# Patient Record
Sex: FEMALE | Race: WHITE | Employment: PART TIME | URBAN - METROPOLITAN AREA
[De-identification: names, ages, dates, MRNs, and addresses within clinical notes are randomized per-mention and may not be internally consistent; named-entity substitution may affect disease eponyms.]

---

## 2017-02-02 DIAGNOSIS — M54.10 BACK PAIN WITH RIGHT-SIDED RADICULOPATHY: ICD-10-CM

## 2017-02-03 RX ORDER — HYDROCODONE BITARTRATE AND ACETAMINOPHEN 5; 325 MG/1; MG/1
1 TABLET ORAL EVERY 4 HOURS PRN
Qty: 60 TABLET | Refills: 0 | Status: SHIPPED | OUTPATIENT
Start: 2017-02-03 | End: 2017-03-29 | Stop reason: SDUPTHER

## 2017-03-29 ENCOUNTER — OFFICE VISIT (OUTPATIENT)
Dept: FAMILY MEDICINE CLINIC | Age: 57
End: 2017-03-29

## 2017-03-29 VITALS
HEART RATE: 76 BPM | DIASTOLIC BLOOD PRESSURE: 72 MMHG | HEIGHT: 64 IN | BODY MASS INDEX: 28.19 KG/M2 | SYSTOLIC BLOOD PRESSURE: 122 MMHG | WEIGHT: 165.13 LBS | RESPIRATION RATE: 12 BRPM

## 2017-03-29 DIAGNOSIS — M54.10 BACK PAIN WITH RIGHT-SIDED RADICULOPATHY: ICD-10-CM

## 2017-03-29 DIAGNOSIS — R11.0 NAUSEA: ICD-10-CM

## 2017-03-29 DIAGNOSIS — I10 ESSENTIAL HYPERTENSION: Primary | ICD-10-CM

## 2017-03-29 DIAGNOSIS — F34.1 DYSTHYMIA: ICD-10-CM

## 2017-03-29 DIAGNOSIS — E78.00 PURE HYPERCHOLESTEROLEMIA: ICD-10-CM

## 2017-03-29 DIAGNOSIS — Z12.11 COLON CANCER SCREENING: ICD-10-CM

## 2017-03-29 DIAGNOSIS — M79.7 FIBROMYALGIA: ICD-10-CM

## 2017-03-29 PROCEDURE — 99213 OFFICE O/P EST LOW 20 MIN: CPT | Performed by: FAMILY MEDICINE

## 2017-03-29 RX ORDER — CITALOPRAM 40 MG/1
TABLET ORAL
Qty: 90 TABLET | Refills: 1 | Status: SHIPPED | OUTPATIENT
Start: 2017-03-29 | End: 2017-06-15 | Stop reason: SDUPTHER

## 2017-03-29 RX ORDER — LISINOPRIL 20 MG/1
TABLET ORAL
Qty: 90 TABLET | Refills: 1 | Status: SHIPPED | OUTPATIENT
Start: 2017-03-29 | End: 2017-06-15 | Stop reason: SDUPTHER

## 2017-03-29 RX ORDER — HYDROCODONE BITARTRATE AND ACETAMINOPHEN 5; 325 MG/1; MG/1
1 TABLET ORAL EVERY 4 HOURS PRN
Qty: 60 TABLET | Refills: 0 | Status: SHIPPED | OUTPATIENT
Start: 2017-03-29 | End: 2017-05-16 | Stop reason: SDUPTHER

## 2017-03-29 RX ORDER — PRAVASTATIN SODIUM 80 MG/1
TABLET ORAL
Qty: 90 TABLET | Refills: 1 | Status: SHIPPED | OUTPATIENT
Start: 2017-03-29 | End: 2017-06-15 | Stop reason: SDUPTHER

## 2017-03-29 RX ORDER — ONDANSETRON 4 MG/1
4 TABLET, FILM COATED ORAL 3 TIMES DAILY PRN
Qty: 30 TABLET | Refills: 1 | Status: SHIPPED | OUTPATIENT
Start: 2017-03-29 | End: 2017-06-15 | Stop reason: SDUPTHER

## 2017-03-29 RX ORDER — ALPRAZOLAM 0.5 MG/1
TABLET ORAL
Qty: 60 TABLET | Refills: 2 | Status: SHIPPED | OUTPATIENT
Start: 2017-03-29 | End: 2017-06-15 | Stop reason: SDUPTHER

## 2017-03-29 ASSESSMENT — ENCOUNTER SYMPTOMS
EYE PAIN: 0
CHEST TIGHTNESS: 0
NAUSEA: 0
CONSTIPATION: 0
SORE THROAT: 0
ORTHOPNEA: 0
WHEEZING: 0
COUGH: 0
ABDOMINAL PAIN: 0
SHORTNESS OF BREATH: 0

## 2017-04-20 ENCOUNTER — NURSE ONLY (OUTPATIENT)
Dept: FAMILY MEDICINE CLINIC | Age: 57
End: 2017-04-20

## 2017-04-20 DIAGNOSIS — Z12.11 COLON CANCER SCREENING: ICD-10-CM

## 2017-04-20 LAB
CONTROL: NORMAL
HEMOCCULT STL QL: NEGATIVE

## 2017-04-20 PROCEDURE — 82274 ASSAY TEST FOR BLOOD FECAL: CPT | Performed by: FAMILY MEDICINE

## 2017-04-21 ENCOUNTER — TELEPHONE (OUTPATIENT)
Dept: FAMILY MEDICINE CLINIC | Age: 57
End: 2017-04-21

## 2017-05-16 DIAGNOSIS — M54.10 BACK PAIN WITH RIGHT-SIDED RADICULOPATHY: ICD-10-CM

## 2017-05-17 RX ORDER — HYDROCODONE BITARTRATE AND ACETAMINOPHEN 5; 325 MG/1; MG/1
1 TABLET ORAL EVERY 4 HOURS PRN
Qty: 60 TABLET | Refills: 0 | Status: SHIPPED | OUTPATIENT
Start: 2017-05-17 | End: 2017-06-15 | Stop reason: SDUPTHER

## 2017-06-15 ENCOUNTER — OFFICE VISIT (OUTPATIENT)
Dept: FAMILY MEDICINE CLINIC | Age: 57
End: 2017-06-15

## 2017-06-15 VITALS
SYSTOLIC BLOOD PRESSURE: 136 MMHG | BODY MASS INDEX: 27.66 KG/M2 | DIASTOLIC BLOOD PRESSURE: 80 MMHG | HEART RATE: 76 BPM | HEIGHT: 64 IN | RESPIRATION RATE: 14 BRPM | WEIGHT: 162 LBS

## 2017-06-15 DIAGNOSIS — R11.0 NAUSEA: ICD-10-CM

## 2017-06-15 DIAGNOSIS — F34.1 DYSTHYMIA: ICD-10-CM

## 2017-06-15 DIAGNOSIS — M54.10 BACK PAIN WITH RIGHT-SIDED RADICULOPATHY: ICD-10-CM

## 2017-06-15 DIAGNOSIS — E78.00 PURE HYPERCHOLESTEROLEMIA: ICD-10-CM

## 2017-06-15 DIAGNOSIS — M75.52 SHOULDER BURSITIS, LEFT: Primary | ICD-10-CM

## 2017-06-15 DIAGNOSIS — M79.7 FIBROMYALGIA: ICD-10-CM

## 2017-06-15 DIAGNOSIS — I10 ESSENTIAL HYPERTENSION: ICD-10-CM

## 2017-06-15 PROCEDURE — 99213 OFFICE O/P EST LOW 20 MIN: CPT | Performed by: FAMILY MEDICINE

## 2017-06-15 RX ORDER — CITALOPRAM 40 MG/1
TABLET ORAL
Qty: 90 TABLET | Refills: 1 | Status: SHIPPED | OUTPATIENT
Start: 2017-06-15 | End: 2017-11-27 | Stop reason: SDUPTHER

## 2017-06-15 RX ORDER — ONDANSETRON 4 MG/1
4 TABLET, FILM COATED ORAL 3 TIMES DAILY PRN
Qty: 30 TABLET | Refills: 1 | Status: SHIPPED | OUTPATIENT
Start: 2017-06-15 | End: 2017-11-27 | Stop reason: SDUPTHER

## 2017-06-15 RX ORDER — ALPRAZOLAM 0.5 MG/1
TABLET ORAL
Qty: 60 TABLET | Refills: 2 | Status: SHIPPED | OUTPATIENT
Start: 2017-06-15 | End: 2017-11-27 | Stop reason: SDUPTHER

## 2017-06-15 RX ORDER — PRAVASTATIN SODIUM 80 MG/1
TABLET ORAL
Qty: 90 TABLET | Refills: 1 | Status: SHIPPED | OUTPATIENT
Start: 2017-06-15 | End: 2017-11-27 | Stop reason: SDUPTHER

## 2017-06-15 RX ORDER — PREDNISONE 20 MG/1
TABLET ORAL
Qty: 24 TABLET | Refills: 0 | Status: SHIPPED | OUTPATIENT
Start: 2017-06-15 | End: 2017-11-10 | Stop reason: ALTCHOICE

## 2017-06-15 RX ORDER — TIZANIDINE 4 MG/1
4 TABLET ORAL EVERY 8 HOURS PRN
Qty: 40 TABLET | Refills: 1 | Status: SHIPPED | OUTPATIENT
Start: 2017-06-15 | End: 2017-11-10 | Stop reason: ALTCHOICE

## 2017-06-15 RX ORDER — HYDROCODONE BITARTRATE AND ACETAMINOPHEN 5; 325 MG/1; MG/1
1 TABLET ORAL EVERY 4 HOURS PRN
Qty: 60 TABLET | Refills: 0 | Status: SHIPPED | OUTPATIENT
Start: 2017-06-15 | End: 2017-08-03 | Stop reason: SDUPTHER

## 2017-06-15 RX ORDER — LISINOPRIL 20 MG/1
TABLET ORAL
Qty: 90 TABLET | Refills: 1 | Status: SHIPPED | OUTPATIENT
Start: 2017-06-15 | End: 2017-11-27 | Stop reason: SDUPTHER

## 2017-06-15 ASSESSMENT — ENCOUNTER SYMPTOMS
NAUSEA: 0
ABDOMINAL PAIN: 0
SHORTNESS OF BREATH: 0
WHEEZING: 0
SORE THROAT: 0
CONSTIPATION: 0
COUGH: 0
CHEST TIGHTNESS: 0
EYE PAIN: 0

## 2017-06-15 ASSESSMENT — PATIENT HEALTH QUESTIONNAIRE - PHQ9
SUM OF ALL RESPONSES TO PHQ QUESTIONS 1-9: 0
2. FEELING DOWN, DEPRESSED OR HOPELESS: 0
1. LITTLE INTEREST OR PLEASURE IN DOING THINGS: 0
SUM OF ALL RESPONSES TO PHQ9 QUESTIONS 1 & 2: 0

## 2017-06-23 ENCOUNTER — TELEPHONE (OUTPATIENT)
Dept: FAMILY MEDICINE CLINIC | Age: 57
End: 2017-06-23

## 2017-08-03 DIAGNOSIS — M54.10 BACK PAIN WITH RIGHT-SIDED RADICULOPATHY: ICD-10-CM

## 2017-08-04 RX ORDER — HYDROCODONE BITARTRATE AND ACETAMINOPHEN 5; 325 MG/1; MG/1
1 TABLET ORAL EVERY 4 HOURS PRN
Qty: 40 TABLET | Refills: 0 | Status: SHIPPED | OUTPATIENT
Start: 2017-08-04 | End: 2017-10-27 | Stop reason: SDUPTHER

## 2017-08-23 ENCOUNTER — NURSE ONLY (OUTPATIENT)
Dept: FAMILY MEDICINE CLINIC | Age: 57
End: 2017-08-23
Payer: COMMERCIAL

## 2017-08-23 DIAGNOSIS — N95.0 POSTMENOPAUSAL BLEEDING: Primary | ICD-10-CM

## 2017-08-23 DIAGNOSIS — Z01.818 PRE-OP TESTING: Primary | ICD-10-CM

## 2017-08-23 LAB
APTT: 33.5 SECONDS (ref 22–38)
BASOPHILS # BLD: 1.2 %
BASOPHILS ABSOLUTE: 0.1 THOU/MM3 (ref 0–0.1)
EOSINOPHIL # BLD: 3.5 %
EOSINOPHILS ABSOLUTE: 0.4 THOU/MM3 (ref 0–0.4)
HCT VFR BLD CALC: 39.4 % (ref 37–47)
HEMOGLOBIN: 13.4 GM/DL (ref 12–16)
INR BLD: 0.96 (ref 0.85–1.13)
LYMPHOCYTES # BLD: 34.1 %
LYMPHOCYTES ABSOLUTE: 3.5 THOU/MM3 (ref 1–4.8)
MCH RBC QN AUTO: 31 PG (ref 27–31)
MCHC RBC AUTO-ENTMCNC: 33.9 GM/DL (ref 33–37)
MCV RBC AUTO: 91.5 FL (ref 81–99)
MONOCYTES # BLD: 7.8 %
MONOCYTES ABSOLUTE: 0.8 THOU/MM3 (ref 0.4–1.3)
NUCLEATED RED BLOOD CELLS: 0 /100 WBC
PDW BLD-RTO: 13.3 % (ref 11.5–14.5)
PLATELET # BLD: 305 THOU/MM3 (ref 130–400)
PMV BLD AUTO: 10.1 MCM (ref 7.4–10.4)
RBC # BLD: 4.3 MILL/MM3 (ref 4.2–5.4)
RBC # BLD: NORMAL 10*6/UL
SEG NEUTROPHILS: 53.4 %
SEGMENTED NEUTROPHILS ABSOLUTE COUNT: 5.5 THOU/MM3 (ref 1.8–7.7)
WBC # BLD: 10.3 THOU/MM3 (ref 4.8–10.8)

## 2017-08-23 PROCEDURE — 93005 ELECTROCARDIOGRAM TRACING: CPT | Performed by: NURSE PRACTITIONER

## 2017-08-23 PROCEDURE — 36415 COLL VENOUS BLD VENIPUNCTURE: CPT | Performed by: NURSE PRACTITIONER

## 2017-10-27 DIAGNOSIS — G89.29 CHRONIC RIGHT-SIDED LOW BACK PAIN WITH RIGHT-SIDED SCIATICA: ICD-10-CM

## 2017-10-27 DIAGNOSIS — M54.41 CHRONIC RIGHT-SIDED LOW BACK PAIN WITH RIGHT-SIDED SCIATICA: ICD-10-CM

## 2017-10-27 NOTE — TELEPHONE ENCOUNTER
Pt called for a refill of:    HYDROcodone-acetaminophen (NORCO) 5-325 MG per tablet Take 1 tablet by mouth every 4 hours as needed for Pain     [facility]     Pt has an appt schedule for 11/27/17

## 2017-10-28 PROBLEM — G89.29 CHRONIC RIGHT-SIDED LOW BACK PAIN WITH SCIATICA: Status: ACTIVE | Noted: 2017-10-28

## 2017-10-28 PROBLEM — M54.40 CHRONIC RIGHT-SIDED LOW BACK PAIN WITH SCIATICA: Status: ACTIVE | Noted: 2017-10-28

## 2017-10-28 RX ORDER — HYDROCODONE BITARTRATE AND ACETAMINOPHEN 5; 325 MG/1; MG/1
1 TABLET ORAL EVERY 4 HOURS PRN
Qty: 40 TABLET | Refills: 0 | Status: SHIPPED | OUTPATIENT
Start: 2017-10-28 | End: 2017-11-27 | Stop reason: SDUPTHER

## 2017-11-10 NOTE — PROGRESS NOTES
NPO after midnight  [employer] and drivers license  Wear comfortable clean clothing  Do not bring jewelry or valuables  Shower night before and morning of surgery with a liquid antibacterial soap  Bring medications   Follow all instructions given by your physician   needed at discharge    In preparation for their surgical procedure above patient was screened for Obstructive Sleep Apnea (JASMINE) using the STOP-Bang Questionnaire by the [place] [address] department. This is a pre-surgical screening tool for patient safety and serves as a recommendation, this WILL NOT cause cancellation of surgery. STOP-Bang Questionnaire  * Do you currently see a pulmonologist?  No     If yes STOP, do not complete. Patient follows with  .    1. Do you snore loudly (able to be heard in the next room)? No    2. Do you often feel tired or sleepy during the daytime? No       3. Has anyone ever told you that you stop breathing during your sleep? No    4. Do you have or are you being treated for high blood pressure? Yes      5. BMI more than 35? BMI (Calculated): 28.4        No    6. Age over 48 years? 65 y.o. Yes    7. Neck Circumference greater than 17 inches for male or 16 inches for female? Measured           (visits only)            Not Applicable    8. Gender Male? No      TOTAL SCORE: 2    JASMINE - Low Risk : Yes to 0 - 2 questions  JASMINE - Intermediate Risk : Yes to 3 - 4 questions  JASMINE - High Risk : Yes to 5 - 8 questions    Adapted from:   STOP Questionnaire: A Tool to Screen Patients for Obstructive Sleep Apnea   MARYAM Olson.C.P.C., EMILY Shannon.B.B.S., Graham Suh M.D., Mikey Pandey. Grayson Whitfield, Ph.D., EMILY Ortiz.B.B.S., Cornelio Cronin M.Sc., Maxx King M.D., Avis Brewster. BIANKA Oh.R.C.P.C.    Anesthesiology 2008; 557:736-43 Copyright 2008, the [address] [employer], [place] . ----------------------------------------------------------------------------------------------------------------

## 2017-11-17 ENCOUNTER — HOSPITAL ENCOUNTER (OUTPATIENT)
Age: 57
Setting detail: OUTPATIENT SURGERY
Discharge: HOME | End: 2017-11-17
Attending: OBSTETRICS & GYNECOLOGY | Admitting: OBSTETRICS & GYNECOLOGY
Payer: COMMERCIAL

## 2017-11-17 ENCOUNTER — ANESTHESIA (OUTPATIENT)
Dept: OPERATING ROOM | Age: 57
End: 2017-11-17
Payer: COMMERCIAL

## 2017-11-17 ENCOUNTER — ANESTHESIA EVENT (OUTPATIENT)
Dept: OPERATING ROOM | Age: 57
End: 2017-11-17
Payer: COMMERCIAL

## 2017-11-17 VITALS
BODY MASS INDEX: 27.46 KG/M2 | DIASTOLIC BLOOD PRESSURE: 58 MMHG | OXYGEN SATURATION: 95 % | HEIGHT: 63 IN | WEIGHT: 155 LBS | RESPIRATION RATE: 14 BRPM | HEART RATE: 56 BPM | SYSTOLIC BLOOD PRESSURE: 101 MMHG | TEMPERATURE: 98.1 F

## 2017-11-17 VITALS
DIASTOLIC BLOOD PRESSURE: 51 MMHG | TEMPERATURE: 14.4 F | RESPIRATION RATE: 2 BRPM | SYSTOLIC BLOOD PRESSURE: 81 MMHG | OXYGEN SATURATION: 99 %

## 2017-11-17 PROCEDURE — 2720000010 HC SURG SUPPLY STERILE: Performed by: OBSTETRICS & GYNECOLOGY

## 2017-11-17 PROCEDURE — 3600000003 HC SURGERY LEVEL 3 BASE: Performed by: OBSTETRICS & GYNECOLOGY

## 2017-11-17 PROCEDURE — 3700000000 HC ANESTHESIA ATTENDED CARE: Performed by: OBSTETRICS & GYNECOLOGY

## 2017-11-17 PROCEDURE — 88305 TISSUE EXAM BY PATHOLOGIST: CPT

## 2017-11-17 PROCEDURE — C1758 CATHETER, URETERAL: HCPCS | Performed by: OBSTETRICS & GYNECOLOGY

## 2017-11-17 PROCEDURE — 2500000003 HC RX 250 WO HCPCS: Performed by: OBSTETRICS & GYNECOLOGY

## 2017-11-17 PROCEDURE — 6370000000 HC RX 637 (ALT 250 FOR IP): Performed by: OBSTETRICS & GYNECOLOGY

## 2017-11-17 PROCEDURE — 7100000011 HC PHASE II RECOVERY - ADDTL 15 MIN: Performed by: OBSTETRICS & GYNECOLOGY

## 2017-11-17 PROCEDURE — 2580000003 HC RX 258: Performed by: OBSTETRICS & GYNECOLOGY

## 2017-11-17 PROCEDURE — 3700000001 HC ADD 15 MINUTES (ANESTHESIA): Performed by: OBSTETRICS & GYNECOLOGY

## 2017-11-17 PROCEDURE — 6360000002 HC RX W HCPCS: Performed by: NURSE ANESTHETIST, CERTIFIED REGISTERED

## 2017-11-17 PROCEDURE — 3600000013 HC SURGERY LEVEL 3 ADDTL 15MIN: Performed by: OBSTETRICS & GYNECOLOGY

## 2017-11-17 PROCEDURE — A6252 ABSORPT DRG >16 <=48 W/O BDR: HCPCS | Performed by: OBSTETRICS & GYNECOLOGY

## 2017-11-17 PROCEDURE — 7100000010 HC PHASE II RECOVERY - FIRST 15 MIN: Performed by: OBSTETRICS & GYNECOLOGY

## 2017-11-17 RX ORDER — SODIUM CHLORIDE 0.9 % (FLUSH) 0.9 %
10 SYRINGE (ML) INJECTION EVERY 12 HOURS SCHEDULED
Status: DISCONTINUED | OUTPATIENT
Start: 2017-11-17 | End: 2017-11-17 | Stop reason: HOSPADM

## 2017-11-17 RX ORDER — KETOROLAC TROMETHAMINE 30 MG/ML
30 INJECTION, SOLUTION INTRAMUSCULAR; INTRAVENOUS EVERY 6 HOURS
Status: DISCONTINUED | OUTPATIENT
Start: 2017-11-17 | End: 2017-11-17 | Stop reason: HOSPADM

## 2017-11-17 RX ORDER — PROPOFOL 10 MG/ML
INJECTION, EMULSION INTRAVENOUS PRN
Status: DISCONTINUED | OUTPATIENT
Start: 2017-11-17 | End: 2017-11-17 | Stop reason: SDUPTHER

## 2017-11-17 RX ORDER — HYDROCODONE BITARTRATE AND ACETAMINOPHEN 5; 325 MG/1; MG/1
1 TABLET ORAL EVERY 4 HOURS PRN
Status: DISCONTINUED | OUTPATIENT
Start: 2017-11-17 | End: 2017-11-17 | Stop reason: HOSPADM

## 2017-11-17 RX ORDER — FENTANYL CITRATE 50 UG/ML
INJECTION, SOLUTION INTRAMUSCULAR; INTRAVENOUS PRN
Status: DISCONTINUED | OUTPATIENT
Start: 2017-11-17 | End: 2017-11-17 | Stop reason: SDUPTHER

## 2017-11-17 RX ORDER — SODIUM CHLORIDE 0.9 % (FLUSH) 0.9 %
10 SYRINGE (ML) INJECTION EVERY 12 HOURS SCHEDULED
Status: DISCONTINUED | OUTPATIENT
Start: 2017-11-17 | End: 2017-11-17

## 2017-11-17 RX ORDER — DOCUSATE SODIUM 100 MG/1
100 CAPSULE, LIQUID FILLED ORAL 2 TIMES DAILY
Status: DISCONTINUED | OUTPATIENT
Start: 2017-11-17 | End: 2017-11-17 | Stop reason: HOSPADM

## 2017-11-17 RX ORDER — SODIUM CHLORIDE 9 MG/ML
INJECTION, SOLUTION INTRAVENOUS CONTINUOUS
Status: DISCONTINUED | OUTPATIENT
Start: 2017-11-17 | End: 2017-11-17

## 2017-11-17 RX ORDER — LABETALOL HYDROCHLORIDE 5 MG/ML
5 INJECTION, SOLUTION INTRAVENOUS EVERY 10 MIN PRN
Status: DISCONTINUED | OUTPATIENT
Start: 2017-11-17 | End: 2017-11-17

## 2017-11-17 RX ORDER — IBUPROFEN 600 MG/1
600 TABLET ORAL EVERY 6 HOURS PRN
Qty: 60 TABLET | Refills: 1 | Status: SHIPPED | OUTPATIENT
Start: 2017-11-17 | End: 2017-11-27 | Stop reason: SDUPTHER

## 2017-11-17 RX ORDER — ACETAMINOPHEN 325 MG/1
650 TABLET ORAL EVERY 4 HOURS PRN
Status: DISCONTINUED | OUTPATIENT
Start: 2017-11-17 | End: 2017-11-17 | Stop reason: HOSPADM

## 2017-11-17 RX ORDER — MIDAZOLAM HYDROCHLORIDE 1 MG/ML
INJECTION INTRAMUSCULAR; INTRAVENOUS PRN
Status: DISCONTINUED | OUTPATIENT
Start: 2017-11-17 | End: 2017-11-17 | Stop reason: SDUPTHER

## 2017-11-17 RX ORDER — MEPERIDINE HYDROCHLORIDE 25 MG/ML
25 INJECTION INTRAMUSCULAR; INTRAVENOUS; SUBCUTANEOUS
Status: DISCONTINUED | OUTPATIENT
Start: 2017-11-17 | End: 2017-11-17

## 2017-11-17 RX ORDER — FENTANYL CITRATE 50 UG/ML
25 INJECTION, SOLUTION INTRAMUSCULAR; INTRAVENOUS EVERY 5 MIN PRN
Status: DISCONTINUED | OUTPATIENT
Start: 2017-11-17 | End: 2017-11-17

## 2017-11-17 RX ORDER — SODIUM CHLORIDE, SODIUM LACTATE, POTASSIUM CHLORIDE, CALCIUM CHLORIDE 600; 310; 30; 20 MG/100ML; MG/100ML; MG/100ML; MG/100ML
INJECTION, SOLUTION INTRAVENOUS CONTINUOUS
Status: DISCONTINUED | OUTPATIENT
Start: 2017-11-17 | End: 2017-11-17 | Stop reason: RX

## 2017-11-17 RX ORDER — HYDROCODONE BITARTRATE AND ACETAMINOPHEN 5; 325 MG/1; MG/1
2 TABLET ORAL EVERY 4 HOURS PRN
Status: DISCONTINUED | OUTPATIENT
Start: 2017-11-17 | End: 2017-11-17 | Stop reason: HOSPADM

## 2017-11-17 RX ORDER — LIDOCAINE HYDROCHLORIDE AND EPINEPHRINE 10; 10 MG/ML; UG/ML
INJECTION, SOLUTION INFILTRATION; PERINEURAL PRN
Status: DISCONTINUED | OUTPATIENT
Start: 2017-11-17 | End: 2017-11-17

## 2017-11-17 RX ORDER — ONDANSETRON 2 MG/ML
4 INJECTION INTRAMUSCULAR; INTRAVENOUS EVERY 6 HOURS PRN
Status: DISCONTINUED | OUTPATIENT
Start: 2017-11-17 | End: 2017-11-17 | Stop reason: HOSPADM

## 2017-11-17 RX ORDER — DIPHENHYDRAMINE HYDROCHLORIDE 50 MG/ML
12.5 INJECTION INTRAMUSCULAR; INTRAVENOUS
Status: DISCONTINUED | OUTPATIENT
Start: 2017-11-17 | End: 2017-11-17

## 2017-11-17 RX ORDER — HYDROCODONE BITARTRATE AND ACETAMINOPHEN 5; 325 MG/1; MG/1
1 TABLET ORAL EVERY 6 HOURS PRN
Qty: 10 TABLET | Refills: 0 | Status: SHIPPED | OUTPATIENT
Start: 2017-11-17 | End: 2017-11-27

## 2017-11-17 RX ORDER — SODIUM CHLORIDE 0.9 % (FLUSH) 0.9 %
10 SYRINGE (ML) INJECTION PRN
Status: DISCONTINUED | OUTPATIENT
Start: 2017-11-17 | End: 2017-11-17

## 2017-11-17 RX ORDER — FENTANYL CITRATE 50 UG/ML
50 INJECTION, SOLUTION INTRAMUSCULAR; INTRAVENOUS EVERY 5 MIN PRN
Status: DISCONTINUED | OUTPATIENT
Start: 2017-11-17 | End: 2017-11-17

## 2017-11-17 RX ORDER — SODIUM CHLORIDE 0.9 % (FLUSH) 0.9 %
10 SYRINGE (ML) INJECTION PRN
Status: DISCONTINUED | OUTPATIENT
Start: 2017-11-17 | End: 2017-11-17 | Stop reason: HOSPADM

## 2017-11-17 RX ORDER — PROMETHAZINE HYDROCHLORIDE 25 MG/ML
12.5 INJECTION, SOLUTION INTRAMUSCULAR; INTRAVENOUS
Status: DISCONTINUED | OUTPATIENT
Start: 2017-11-17 | End: 2017-11-17

## 2017-11-17 RX ADMIN — FENTANYL CITRATE 25 MCG: 50 INJECTION INTRAMUSCULAR; INTRAVENOUS at 14:42

## 2017-11-17 RX ADMIN — PROPOFOL 50 MG: 10 INJECTION, EMULSION INTRAVENOUS at 14:35

## 2017-11-17 RX ADMIN — FENTANYL CITRATE 25 MCG: 50 INJECTION INTRAMUSCULAR; INTRAVENOUS at 14:46

## 2017-11-17 RX ADMIN — SODIUM CHLORIDE, POTASSIUM CHLORIDE, SODIUM LACTATE AND CALCIUM CHLORIDE: 600; 310; 30; 20 INJECTION, SOLUTION INTRAVENOUS at 14:15

## 2017-11-17 RX ADMIN — PROPOFOL 40 MG: 10 INJECTION, EMULSION INTRAVENOUS at 14:45

## 2017-11-17 RX ADMIN — FENTANYL CITRATE 50 MCG: 50 INJECTION INTRAMUSCULAR; INTRAVENOUS at 14:27

## 2017-11-17 RX ADMIN — HYDROCODONE BITARTRATE AND ACETAMINOPHEN 1 TABLET: 5; 325 TABLET ORAL at 16:03

## 2017-11-17 RX ADMIN — PROPOFOL 30 MG: 10 INJECTION, EMULSION INTRAVENOUS at 14:52

## 2017-11-17 RX ADMIN — PROPOFOL 50 MG: 10 INJECTION, EMULSION INTRAVENOUS at 14:33

## 2017-11-17 RX ADMIN — SODIUM CHLORIDE: 9 INJECTION, SOLUTION INTRAVENOUS at 13:26

## 2017-11-17 RX ADMIN — PROPOFOL 20 MG: 10 INJECTION, EMULSION INTRAVENOUS at 14:39

## 2017-11-17 RX ADMIN — PROPOFOL 40 MG: 10 INJECTION, EMULSION INTRAVENOUS at 14:42

## 2017-11-17 RX ADMIN — MIDAZOLAM HYDROCHLORIDE 2 MG: 1 INJECTION, SOLUTION INTRAMUSCULAR; INTRAVENOUS at 14:23

## 2017-11-17 ASSESSMENT — PULMONARY FUNCTION TESTS
PIF_VALUE: 0

## 2017-11-17 ASSESSMENT — PAIN SCALES - GENERAL
PAINLEVEL_OUTOF10: 6
PAINLEVEL_OUTOF10: 8
PAINLEVEL_OUTOF10: 6

## 2017-11-17 ASSESSMENT — PAIN DESCRIPTION - PAIN TYPE: TYPE: SURGICAL PAIN

## 2017-11-17 NOTE — H&P
IntegumentDenies : rash, changes to existing skin lesions or moles   MusculoskeletalDenies : joint pain, muscle pain   EndocrineDenies : polyuria, polydipsia, cold intolerance, heat intolerance   PsychiatricDenies : anxiety, depression, feeling confused, difficulty sleeping, excessive anger   Heme-LymphDenies : easy bleeding, easy bruising, lymph node enlargement or tenderness       Vitals   112/70 Sitting       157lbs 0oz 5'  3\" 27.81 1.78             Physical Examination   ConstitutionalAppearance : well-nourished, well developed, alert, oriented, in no acute distress   EyesConjunctiva/Eyelids : conjunctiva normal, eyelid appearance normal   Sclera : sclera white   ChestRespiratory Effort : breathing unlabored   Auscultation : normal breath sounds, no rales, no rhonchi   CardiovascularHeart :    Auscultation : regular rate, normal rhythm, no murmurs present   Peripheral Vascular System :   Peripheral Circulation : no edema or cyanosis in extremities   GastrointestinalAbdominal Examination : abdomen nontender to palpation, normal bowel sounds, tone normal without rigidity or guarding, no masses present   GenitourinaryExternal Genitalia : normal appearance for age, no discharge or inflammatory lesions present   Vagina : no discharge present, no inflammatory lesions present, no masses present, normal vaginal vault   Bladder : nontender to palpation, no cystocele present   Urethra :   Urethral Meatus : no erythema or lesions present   Cervix : appearance healthy, no lesions present, nontender to palpation, no discharges   Uterus : nontender to palpation, no masses present, contour: smooth to palpation, position midline/midplane, size normal   Adnexa : no adnexal tenderness present, no adnexal masses present   Anus : anus within normal limits, no hemorrhoids present   Inguinal Lymph Nodes : no lymphadenopathy present   Neurologic/PsychiatricMental Status :   Orientation : grossly oriented to person, place and time

## 2017-11-17 NOTE — OP NOTE
Gyn Service    Operative Report      Pt Name: Jagdeep Bravo  MRN: [id number] [place] #: [de-identified]  YOB: 1960  Procedure Performed By: Karla Obrien D.O.        Pre-operative Diagnosis: Thickened endometrium, PMB    Post-operative Diagnosis:  SAME, endometrial polyp    Procedure:  Hysterosocopy with dilation, Myosure    Surgeon:  Karla Obrien D.O. Anesthesia:  IV sedation with local    Estimated blood loss:  Less than 5 ml    Findings:  Non-enlarged uterus with with a 1cm polyp anteriorly by the right tubal os    Complications:  NONE      Patient was taken to the operative room where IV sedation anesthesia was achieved without difficulty. She was then placed in the dorsal lithotomy position and prepped and draped in the normal sterile fashion. A weighted speculum was placed without difficulty and the anterior lip of the cervix was grasped with a single-tooth teneculum. 20cc of 1% marcaine with epinephrine was injected @ the 3 and 9 o'clock position in total.  Cervix was serially dilated to 16F [place] with the use of cooper dilators. The hysteroscope was introduced with lactated ringer's as a distending medium and findings as noted above. Myosure was introduced into the endometrial cavity and curettage was performed. This was carried out for 1 min 10 sec and a base deficit of 246 cc was noted. Tissue was sent to pathology for permanent evaluation. Hemostasis was assured. Sponge, instrument and needle counts were correct. The patient was awakened from anesthesia and taken to the recovery room in good condition.

## 2017-11-17 NOTE — PROGRESS NOTES
1517:  Patient returned to [facility] room 18 from PACU. Patient shaking, refused warm blanket. Tracy pad placed per patient request.  No drainage noted from tracy area. Adrian Browne, RN, informed of patient shaking and no Demrerol was given to patient. Adrian Browne called PACU and informed them of patient's condition. trish ashish brought to patient's room and placed on patient. Will continue to monitor. Ice chips gotten for patient also. Dr. Sreedhar Guerin called and informed of patient needing prescription for Norco per patient's . 1351:  Trish hugger turned off per patient request.  No drainage on tracy pad. Abdomen soft. Informed patient's  Dr. Sreedhar Guerin called about Norco prescription.

## 2017-11-17 NOTE — ANESTHESIA PRE PROCEDURE
Doroteo Cook MD        diphenhydrAMINE (BENADRYL) injection 12.5 mg  12.5 mg Intravenous Once PRN Doroteo Cook MD        promethazine [facility]) injection 12.5 mg  12.5 mg Intramuscular Once PRN Doroteo Cook MD        labetalol (NORMODYNE;TRANDATE) injection 5 mg  5 mg Intravenous Q10 Min PRN Doroteo Cook MD        meperidine (DEMEROL) injection 25 mg  25 mg Intravenous Once PRN Doroteo Cook MD           Allergies:     Allergies   Allergen Reactions    Latex Dermatitis     From the powder in the gloves    Flexeril [Cyclobenzaprine Hcl] Other (See Comments)     \"feel like bugs crawling all over me\"       Problem List:    Patient Active Problem List   Diagnosis Code    Hypertension I10    Hyperlipidemia E78.5    Headache R51    Fibromyalgia M79.7    Depression F32.9    Chronic right-sided low back pain with sciatica M54.40, G89.29       Past Medical History:        Diagnosis Date    Back pain     Depression     Fibromyalgia     Headache(784.0)     History of cold sores     Hyperlipidemia     Hypertension     Nausea & vomiting     PONV (postoperative nausea and vomiting)     Tobacco dependency        Past Surgical History:        Procedure Laterality Date    BACK SURGERY      BREAST BIOPSY      right; negative    CERVICAL FUSION  3/00    C5-6     SECTION  1986    DILATION AND CURETTAGE OF UTERUS  1983    LUMBAR SPINE SURGERY  10/11/2013    L 4-5 Decompression and Posterior Fusion    OTHER SURGICAL HISTORY  6/15/2016    EXTRACTIONS TEETH # [id number]    TUBAL LIGATION         Social History:    Social History   Substance Use Topics    Smoking status: Former Smoker     Packs/day: 1.50     Years: 30.00     Start date: 2010     Quit date:     Smokeless tobacco: Never Used    Alcohol use Yes      Comment: Rarely                                 Counseling given: Not Answered      Vital Signs (Current):   Vitals:    11/10/17 1032   Weight: 160 lb (72.6 kg)   Height: 5' 3\" (1.6 m)                                              BP Readings from Last 3 Encounters:   06/15/17 136/80   03/29/17 122/72   11/28/16 112/68       NPO Status:                                                                                 BMI:   Wt Readings from Last 3 Encounters:   11/10/17 160 lb (72.6 kg)   06/15/17 162 lb (73.5 kg)   03/29/17 165 lb 2 oz (74.9 kg)     Body mass index is 28.34 kg/m². CBC:   Lab Results   Component Value Date    WBC 10.3 08/23/2017    RBC 4.30 08/23/2017    HGB 13.4 08/23/2017    HCT 39.4 08/23/2017    MCV 91.5 08/23/2017    RDW 13.3 08/23/2017     08/23/2017       CMP:   Lab Results   Component Value Date     06/15/2016    K 4.2 06/15/2016     06/15/2016    CO2 26 06/15/2016    BUN 15 06/15/2016    CREATININE 0.7 06/15/2016    LABGLOM 87 06/15/2016    GLUCOSE 104 06/15/2016    CALCIUM 9.3 06/15/2016       POC Tests: No results for input(s): POCGLU, POCNA, POCK, POCCL, POCBUN, POCHEMO, POCHCT in the last 72 hours. Coags:   Lab Results   Component Value Date    INR 0.96 08/23/2017    APTT 33.5 08/23/2017       HCG (If Applicable): No results found for: PREGTESTUR, PREGSERUM, HCG, HCGQUANT     ABGs: No results found for: PHART, PO2ART, MNN8RVD, MXV0AYP, BEART, S1GQEEQI     Type & Screen (If Applicable):  Lab Results   Component Value Date    [address] POS 10/11/2013       Anesthesia Evaluation  Patient summary reviewed and Nursing notes reviewed   history of anesthetic complications: PONV.   Airway: Mallampati: II  TM distance: >3 FB   Neck ROM: limited  Comment: S/P cervical fusion  TMJ difficulty  Mouth opening: < 3 FB Dental: normal exam         Pulmonary:Negative Pulmonary ROS                              Cardiovascular:    (+) hypertension: mild,     (-) past MI, CAD, dysrhythmias,  angina and  CHF       Beta Blocker:  Not on Beta Blocker         Neuro/Psych:   (+) psychiatric history: stable with treatment               Comments:

## 2017-11-17 NOTE — INTERVAL H&P NOTE
[address] [place] [address]  History and Physical Update    Pt Name: Neil Duran  MRN: [id number]  YOB: 1960  Date of evaluation: 11/17/2017    I have examined the patient and reviewed the H&P/Consult and there are no changes to the patient or plans.       Marshall Krabbe  Electronically signed 11/17/2017 at 2:17 PM

## 2017-11-17 NOTE — ANESTHESIA POSTPROCEDURE EVALUATION
Department of Anesthesiology  Postprocedure Note    Patient: Hao Tay  MRN: [id number]  YOB: 1960  Date of evaluation: 11/17/2017  Time:  6:38 PM     Procedure Summary     Date:  11/17/17 Room / Location:  [address] [facility] [place]    Anesthesia Start:  5265 Anesthesia Stop:  6854    Procedure:  DILATATION AND CURETTAGE HYSTEROSCOPY (N/A ) Diagnosis:  (THICKENED ENDOMETRIUM)    Surgeon:  Johnny Bains DO Responsible Provider:  Gabi Mays MD    Anesthesia Type:  MAC ASA Status:  2          Anesthesia Type: MAC    Bushra Phase I: Bushra Score: 10    Bushra Phase II: Bushra Score: 10    Last vitals: Reviewed and per EMR flowsheets.        Anesthesia Post Evaluation    Patient location during evaluation: bedside  Patient participation: complete - patient participated  Level of consciousness: awake  Airway patency: patent  Nausea & Vomiting: no vomiting and no nausea  Complications: no  Cardiovascular status: hemodynamically stable  Respiratory status: acceptable  Hydration status: stable

## 2017-11-27 ENCOUNTER — OFFICE VISIT (OUTPATIENT)
Dept: FAMILY MEDICINE CLINIC | Age: 57
End: 2017-11-27

## 2017-11-27 VITALS
HEIGHT: 63 IN | SYSTOLIC BLOOD PRESSURE: 134 MMHG | HEART RATE: 60 BPM | DIASTOLIC BLOOD PRESSURE: 74 MMHG | BODY MASS INDEX: 27.55 KG/M2 | RESPIRATION RATE: 10 BRPM | WEIGHT: 155.5 LBS

## 2017-11-27 DIAGNOSIS — M54.2 CHRONIC NECK PAIN: ICD-10-CM

## 2017-11-27 DIAGNOSIS — R11.0 NAUSEA: ICD-10-CM

## 2017-11-27 DIAGNOSIS — I10 ESSENTIAL HYPERTENSION: Primary | ICD-10-CM

## 2017-11-27 DIAGNOSIS — M54.41 CHRONIC RIGHT-SIDED LOW BACK PAIN WITH RIGHT-SIDED SCIATICA: ICD-10-CM

## 2017-11-27 DIAGNOSIS — E78.00 PURE HYPERCHOLESTEROLEMIA: ICD-10-CM

## 2017-11-27 DIAGNOSIS — M79.7 FIBROMYALGIA: ICD-10-CM

## 2017-11-27 DIAGNOSIS — G89.29 CHRONIC NECK PAIN: ICD-10-CM

## 2017-11-27 DIAGNOSIS — G89.29 CHRONIC RIGHT-SIDED LOW BACK PAIN WITH RIGHT-SIDED SCIATICA: ICD-10-CM

## 2017-11-27 DIAGNOSIS — F34.1 DYSTHYMIA: ICD-10-CM

## 2017-11-27 PROCEDURE — 99213 OFFICE O/P EST LOW 20 MIN: CPT | Performed by: FAMILY MEDICINE

## 2017-11-27 RX ORDER — HYDROCODONE BITARTRATE AND ACETAMINOPHEN 5; 325 MG/1; MG/1
1 TABLET ORAL EVERY 4 HOURS PRN
Qty: 40 TABLET | Refills: 0 | Status: SHIPPED | OUTPATIENT
Start: 2017-11-27 | End: 2018-01-05 | Stop reason: SDUPTHER

## 2017-11-27 RX ORDER — CITALOPRAM 40 MG/1
TABLET ORAL
Qty: 90 TABLET | Refills: 1 | Status: SHIPPED | OUTPATIENT
Start: 2017-11-27 | End: 2018-03-12 | Stop reason: SDUPTHER

## 2017-11-27 RX ORDER — PRAVASTATIN SODIUM 80 MG/1
TABLET ORAL
Qty: 90 TABLET | Refills: 1 | Status: SHIPPED | OUTPATIENT
Start: 2017-11-27 | End: 2018-03-12 | Stop reason: SDUPTHER

## 2017-11-27 RX ORDER — ONDANSETRON 4 MG/1
4 TABLET, FILM COATED ORAL 3 TIMES DAILY PRN
Qty: 30 TABLET | Refills: 1 | Status: SHIPPED | OUTPATIENT
Start: 2017-11-27 | End: 2018-03-12 | Stop reason: SDUPTHER

## 2017-11-27 RX ORDER — LISINOPRIL 20 MG/1
TABLET ORAL
Qty: 90 TABLET | Refills: 1 | Status: SHIPPED | OUTPATIENT
Start: 2017-11-27 | End: 2018-03-12 | Stop reason: SDUPTHER

## 2017-11-27 RX ORDER — IBUPROFEN 600 MG/1
600 TABLET ORAL EVERY 6 HOURS PRN
Qty: 60 TABLET | Refills: 1 | Status: SHIPPED | OUTPATIENT
Start: 2017-11-27 | End: 2018-03-12 | Stop reason: SDUPTHER

## 2017-11-27 RX ORDER — ALPRAZOLAM 0.5 MG/1
TABLET ORAL
Qty: 60 TABLET | Refills: 2 | Status: SHIPPED | OUTPATIENT
Start: 2017-11-27 | End: 2018-03-12 | Stop reason: SDUPTHER

## 2017-11-27 ASSESSMENT — ENCOUNTER SYMPTOMS
COUGH: 0
EYE PAIN: 0
NAUSEA: 0
ABDOMINAL PAIN: 0
CONSTIPATION: 0
CHEST TIGHTNESS: 0
SHORTNESS OF BREATH: 0
SORE THROAT: 0
WHEEZING: 0

## 2017-11-27 NOTE — PROGRESS NOTES
not bruise/bleed easily. Psychiatric/Behavioral: Negative for behavioral problems and sleep disturbance. The patient is not nervous/anxious. /74 (Site: Right Arm, Position: Sitting, Cuff Size: Medium Adult)   Pulse 60   Resp 10   Ht 5' 3\" (1.6 m)   Wt 155 lb 8 oz (70.5 kg)   LMP 01/06/2015   Breastfeeding? No   BMI 27.55 kg/m²   Objective:   Physical Exam   Constitutional: She is oriented to person, place, and time. She appears well-developed and well-nourished. HENT:   Head: Normocephalic and atraumatic. Right Ear: External ear normal.   Left Ear: External ear normal.   Nose: Nose normal.   Mouth/Throat: Oropharynx is clear and moist.   Eyes: Conjunctivae and EOM are normal. Pupils are equal, round, and reactive to light. No scleral icterus. Neck: Normal range of motion. Neck supple. No JVD present. No thyromegaly present. Trigger  (points  Of  Neck    Cardiovascular: Normal rate, regular rhythm, normal heart sounds and intact distal pulses. Pulmonary/Chest: Effort normal and breath sounds normal. She has no wheezes. She has no rales. Abdominal: Soft. Bowel sounds are normal. She exhibits no distension and no mass. There is no tenderness. Musculoskeletal: Normal range of motion. She exhibits no tenderness. Lymphadenopathy:     She has no cervical adenopathy. Neurological: She is alert and oriented to person, place, and time. She has normal reflexes. No cranial nerve deficit. Skin: Skin is warm and dry. No rash noted. Psychiatric: She has a normal mood and affect. Nursing note and vitals reviewed. Assessment:      1. Essential hypertension  lisinopril (PRINIVIL;ZESTRIL) 20 MG tablet   2. Pure hypercholesterolemia  pravastatin (PRAVACHOL) 80 MG tablet   3. Nausea  ondansetron (ZOFRAN) 4 MG tablet   4. Fibromyalgia  ondansetron (ZOFRAN) 4 MG tablet   5.  Chronic right-sided low back pain with right-sided sciatica  HYDROcodone-acetaminophen (NORCO) 5-325 MG per tablet ibuprofen (ADVIL;MOTRIN) 600 MG tablet   6. Dysthymia  citalopram (CELEXA) 40 MG tablet    ALPRAZolam (XANAX) 0.5 MG tablet   7. Chronic neck pain  ibuprofen (ADVIL;MOTRIN) 600 MG tablet         Plan:      Current Outpatient Prescriptions   Medication Sig Dispense Refill    pravastatin (PRAVACHOL) 80 MG tablet take 1 tablet by mouth once daily 90 tablet 1    ondansetron (ZOFRAN) 4 MG tablet Take 1 tablet by mouth 3 times daily as needed for Nausea 30 tablet 1    lisinopril (PRINIVIL;ZESTRIL) 20 MG tablet take 1 tablet by mouth once daily 90 tablet 1    HYDROcodone-acetaminophen (NORCO) 5-325 MG per tablet Take 1 tablet by mouth every 4 hours as needed for Pain . 40 tablet 0    ibuprofen (ADVIL;MOTRIN) 600 MG tablet Take 1 tablet by mouth every 6 hours as needed for Pain 60 tablet 1    citalopram (CELEXA) 40 MG tablet take 1 tablet by mouth once daily 90 tablet 1    ALPRAZolam (XANAX) 0.5 MG tablet take 1 tablet by mouth twice a day if needed. 60 tablet 2     No current facility-administered medications for this visit. No orders of the defined types were placed in this encounter.         See in  3 mths    On  norco

## 2018-01-05 DIAGNOSIS — M54.41 CHRONIC RIGHT-SIDED LOW BACK PAIN WITH RIGHT-SIDED SCIATICA: ICD-10-CM

## 2018-01-05 DIAGNOSIS — G89.29 CHRONIC RIGHT-SIDED LOW BACK PAIN WITH RIGHT-SIDED SCIATICA: ICD-10-CM

## 2018-01-06 RX ORDER — HYDROCODONE BITARTRATE AND ACETAMINOPHEN 5; 325 MG/1; MG/1
1 TABLET ORAL EVERY 4 HOURS PRN
Qty: 40 TABLET | Refills: 0 | Status: SHIPPED | OUTPATIENT
Start: 2018-01-06 | End: 2018-02-12

## 2018-03-12 ENCOUNTER — OFFICE VISIT (OUTPATIENT)
Dept: FAMILY MEDICINE CLINIC | Age: 58
End: 2018-03-12

## 2018-03-12 VITALS
WEIGHT: 152.13 LBS | RESPIRATION RATE: 14 BRPM | SYSTOLIC BLOOD PRESSURE: 116 MMHG | HEIGHT: 63 IN | BODY MASS INDEX: 26.95 KG/M2 | HEART RATE: 68 BPM | DIASTOLIC BLOOD PRESSURE: 68 MMHG

## 2018-03-12 DIAGNOSIS — E78.00 PURE HYPERCHOLESTEROLEMIA: ICD-10-CM

## 2018-03-12 DIAGNOSIS — G89.29 CHRONIC RIGHT-SIDED LOW BACK PAIN WITH RIGHT-SIDED SCIATICA: ICD-10-CM

## 2018-03-12 DIAGNOSIS — R11.0 NAUSEA: ICD-10-CM

## 2018-03-12 DIAGNOSIS — G89.29 CHRONIC NECK PAIN: ICD-10-CM

## 2018-03-12 DIAGNOSIS — M54.41 CHRONIC RIGHT-SIDED LOW BACK PAIN WITH RIGHT-SIDED SCIATICA: ICD-10-CM

## 2018-03-12 DIAGNOSIS — M79.7 FIBROMYALGIA: ICD-10-CM

## 2018-03-12 DIAGNOSIS — M54.2 CHRONIC NECK PAIN: ICD-10-CM

## 2018-03-12 DIAGNOSIS — F34.1 DYSTHYMIA: ICD-10-CM

## 2018-03-12 DIAGNOSIS — I10 ESSENTIAL HYPERTENSION: Primary | ICD-10-CM

## 2018-03-12 PROCEDURE — 99213 OFFICE O/P EST LOW 20 MIN: CPT | Performed by: FAMILY MEDICINE

## 2018-03-12 RX ORDER — LISINOPRIL 20 MG/1
TABLET ORAL
Qty: 90 TABLET | Refills: 1 | Status: SHIPPED | OUTPATIENT
Start: 2018-03-12 | End: 2018-10-22 | Stop reason: SDUPTHER

## 2018-03-12 RX ORDER — PRAVASTATIN SODIUM 80 MG/1
TABLET ORAL
Qty: 90 TABLET | Refills: 1 | Status: SHIPPED | OUTPATIENT
Start: 2018-03-12 | End: 2018-10-22 | Stop reason: SDUPTHER

## 2018-03-12 RX ORDER — ALPRAZOLAM 0.5 MG/1
TABLET ORAL
Qty: 60 TABLET | Refills: 2 | Status: SHIPPED | OUTPATIENT
Start: 2018-03-12 | End: 2018-06-12

## 2018-03-12 RX ORDER — HYDROCODONE BITARTRATE AND ACETAMINOPHEN 5; 325 MG/1; MG/1
1 TABLET ORAL EVERY 4 HOURS PRN
Qty: 40 TABLET | Refills: 0 | Status: SHIPPED | OUTPATIENT
Start: 2018-03-12 | End: 2018-04-11

## 2018-03-12 RX ORDER — ONDANSETRON 4 MG/1
4 TABLET, FILM COATED ORAL 3 TIMES DAILY PRN
Qty: 30 TABLET | Refills: 1 | Status: SHIPPED | OUTPATIENT
Start: 2018-03-12 | End: 2018-10-22 | Stop reason: SDUPTHER

## 2018-03-12 RX ORDER — CITALOPRAM 40 MG/1
TABLET ORAL
Qty: 90 TABLET | Refills: 1 | Status: SHIPPED | OUTPATIENT
Start: 2018-03-12 | End: 2018-10-01 | Stop reason: SDUPTHER

## 2018-03-12 RX ORDER — IBUPROFEN 600 MG/1
600 TABLET ORAL EVERY 6 HOURS PRN
Qty: 60 TABLET | Refills: 1 | Status: SHIPPED | OUTPATIENT
Start: 2018-03-12 | End: 2018-10-22 | Stop reason: ALTCHOICE

## 2018-03-12 ASSESSMENT — ENCOUNTER SYMPTOMS
WHEEZING: 0
ORTHOPNEA: 0
CHEST TIGHTNESS: 0
CONSTIPATION: 0
SHORTNESS OF BREATH: 0
ABDOMINAL PAIN: 0
NAUSEA: 0
EYE PAIN: 0
SORE THROAT: 0
COUGH: 0

## 2018-09-28 DIAGNOSIS — F34.1 DYSTHYMIA: ICD-10-CM

## 2018-09-28 DIAGNOSIS — G89.29 CHRONIC RIGHT-SIDED LOW BACK PAIN WITH RIGHT-SIDED SCIATICA: ICD-10-CM

## 2018-09-28 DIAGNOSIS — G89.29 CHRONIC NECK PAIN: ICD-10-CM

## 2018-09-28 DIAGNOSIS — M54.2 CHRONIC NECK PAIN: ICD-10-CM

## 2018-09-28 DIAGNOSIS — M54.41 CHRONIC RIGHT-SIDED LOW BACK PAIN WITH RIGHT-SIDED SCIATICA: ICD-10-CM

## 2018-09-28 RX ORDER — HYDROCODONE BITARTRATE AND ACETAMINOPHEN 5; 325 MG/1; MG/1
1 TABLET ORAL EVERY 4 HOURS PRN
Qty: 40 TABLET | Refills: 0 | Status: CANCELLED | OUTPATIENT
Start: 2018-09-28 | End: 2018-10-28

## 2018-09-28 NOTE — TELEPHONE ENCOUNTER
Date of last visit:  3/12/2018  Date of next visit:  10/22/2018    Requested Prescriptions      No prescriptions requested or ordered in this encounter

## 2018-10-01 RX ORDER — CITALOPRAM 40 MG/1
TABLET ORAL
Qty: 30 TABLET | Refills: 0 | Status: SHIPPED | OUTPATIENT
Start: 2018-10-01 | End: 2018-10-22 | Stop reason: SDUPTHER

## 2018-10-01 NOTE — TELEPHONE ENCOUNTER
Patient called back stating she has an appt 10/22/18 with Dr Clay Mercado and is OK if just the Celexa is called to [facility] [place] in [facility].

## 2018-10-22 ENCOUNTER — OFFICE VISIT (OUTPATIENT)
Dept: FAMILY MEDICINE CLINIC | Age: 58
End: 2018-10-22

## 2018-10-22 VITALS
HEART RATE: 72 BPM | SYSTOLIC BLOOD PRESSURE: 126 MMHG | HEIGHT: 63 IN | WEIGHT: 146.13 LBS | RESPIRATION RATE: 10 BRPM | BODY MASS INDEX: 25.89 KG/M2 | DIASTOLIC BLOOD PRESSURE: 76 MMHG

## 2018-10-22 DIAGNOSIS — G89.29 CHRONIC RIGHT-SIDED LOW BACK PAIN WITH RIGHT-SIDED SCIATICA: ICD-10-CM

## 2018-10-22 DIAGNOSIS — M54.2 CHRONIC NECK PAIN: ICD-10-CM

## 2018-10-22 DIAGNOSIS — R11.0 NAUSEA: ICD-10-CM

## 2018-10-22 DIAGNOSIS — M79.7 FIBROMYALGIA: ICD-10-CM

## 2018-10-22 DIAGNOSIS — M54.41 CHRONIC RIGHT-SIDED LOW BACK PAIN WITH RIGHT-SIDED SCIATICA: ICD-10-CM

## 2018-10-22 DIAGNOSIS — E78.00 PURE HYPERCHOLESTEROLEMIA: ICD-10-CM

## 2018-10-22 DIAGNOSIS — I10 ESSENTIAL HYPERTENSION: Primary | ICD-10-CM

## 2018-10-22 DIAGNOSIS — F34.1 DYSTHYMIA: ICD-10-CM

## 2018-10-22 DIAGNOSIS — G89.29 CHRONIC NECK PAIN: ICD-10-CM

## 2018-10-22 PROCEDURE — 99213 OFFICE O/P EST LOW 20 MIN: CPT | Performed by: FAMILY MEDICINE

## 2018-10-22 RX ORDER — PRAVASTATIN SODIUM 80 MG/1
TABLET ORAL
Qty: 90 TABLET | Refills: 1 | Status: SHIPPED | OUTPATIENT
Start: 2018-10-22 | End: 2019-04-01 | Stop reason: SDUPTHER

## 2018-10-22 RX ORDER — DIAZEPAM 10 MG/1
5 TABLET ORAL NIGHTLY
COMMUNITY
End: 2019-04-01 | Stop reason: ALTCHOICE

## 2018-10-22 RX ORDER — IBUPROFEN 800 MG/1
800 TABLET ORAL EVERY 8 HOURS PRN
Qty: 100 TABLET | Refills: 0 | Status: SHIPPED | OUTPATIENT
Start: 2018-10-22 | End: 2021-03-10 | Stop reason: ALTCHOICE

## 2018-10-22 RX ORDER — ONDANSETRON 4 MG/1
4 TABLET, FILM COATED ORAL 3 TIMES DAILY PRN
Qty: 30 TABLET | Refills: 1 | Status: SHIPPED | OUTPATIENT
Start: 2018-10-22 | End: 2019-04-01 | Stop reason: SDUPTHER

## 2018-10-22 RX ORDER — LISINOPRIL 20 MG/1
TABLET ORAL
Qty: 90 TABLET | Refills: 1 | Status: SHIPPED | OUTPATIENT
Start: 2018-10-22 | End: 2019-04-01 | Stop reason: SDUPTHER

## 2018-10-22 RX ORDER — IBUPROFEN 800 MG/1
800 TABLET ORAL EVERY 8 HOURS PRN
Qty: 100 TABLET | Refills: 0 | Status: SHIPPED | OUTPATIENT
Start: 2018-10-22 | End: 2018-10-22 | Stop reason: SDUPTHER

## 2018-10-22 RX ORDER — CITALOPRAM 40 MG/1
TABLET ORAL
Qty: 90 TABLET | Refills: 1 | Status: SHIPPED | OUTPATIENT
Start: 2018-10-22 | End: 2019-04-01 | Stop reason: SDUPTHER

## 2018-10-22 ASSESSMENT — ENCOUNTER SYMPTOMS
EYE PAIN: 0
WHEEZING: 0
BLURRED VISION: 0
SHORTNESS OF BREATH: 0
ORTHOPNEA: 0
CHEST TIGHTNESS: 0
CONSTIPATION: 0

## 2018-10-22 NOTE — PROGRESS NOTES
adenopathy. Does not bruise/bleed easily. Psychiatric/Behavioral: Negative for behavioral problems and sleep disturbance. The patient is not nervous/anxious. /76 (Site: Left Upper Arm, Position: Sitting, Cuff Size: Medium Adult)   Pulse 72   Resp 10   Ht 5' 3\" (1.6 m)   Wt 146 lb 2 oz (66.3 kg)   LMP 01/06/2015   Breastfeeding? No   BMI 25.88 kg/m²   Objective:   Physical Exam   Constitutional: She is oriented to person, place, and time. She appears well-developed and well-nourished. HENT:   Head: Normocephalic and atraumatic. Right Ear: External ear normal.   Left Ear: External ear normal.   Nose: Nose normal.   Mouth/Throat: Oropharynx is clear and moist.   Eyes: Pupils are equal, round, and reactive to light. Conjunctivae and EOM are normal. No scleral icterus. Neck: Normal range of motion. Neck supple. No JVD present. No thyromegaly present. Cardiovascular: Normal rate, regular rhythm, normal heart sounds and intact distal pulses. Pulmonary/Chest: Effort normal and breath sounds normal. She has no wheezes. She has no rales. Abdominal: Soft. Bowel sounds are normal. She exhibits no distension and no mass. There is no tenderness. Musculoskeletal: Normal range of motion. She exhibits no tenderness. Diffuse  Aches noted of the neck area at the base the occiput top border of the scapula along the sacroiliac joint. Hip rotation is normal. Diffuse muscular aches   Lymphadenopathy:     She has no cervical adenopathy. Neurological: She is alert and oriented to person, place, and time. She has normal reflexes. No cranial nerve deficit. Skin: Skin is warm and dry. No rash noted. Psychiatric: She has a normal mood and affect. Nursing note and vitals reviewed. Assessment:       Diagnosis Orders   1. Essential hypertension  lisinopril (PRINIVIL;ZESTRIL) 20 MG tablet    TSH with Reflex    Lipid Panel    Comprehensive Metabolic Panel    CBC Auto Differential   2.  Pure

## 2018-10-24 ENCOUNTER — NURSE ONLY (OUTPATIENT)
Dept: FAMILY MEDICINE CLINIC | Age: 58
End: 2018-10-24

## 2018-10-24 DIAGNOSIS — I10 ESSENTIAL HYPERTENSION: ICD-10-CM

## 2018-10-24 DIAGNOSIS — M79.7 FIBROMYALGIA: ICD-10-CM

## 2018-10-24 LAB
ALBUMIN SERPL-MCNC: 4.2 G/DL (ref 3.5–5.1)
ALP BLD-CCNC: 77 U/L (ref 38–126)
ALT SERPL-CCNC: 14 U/L (ref 11–66)
ANION GAP SERPL CALCULATED.3IONS-SCNC: 9 MEQ/L (ref 8–16)
AST SERPL-CCNC: 16 U/L (ref 5–40)
BASOPHILS # BLD: 1.1 %
BASOPHILS ABSOLUTE: 0.1 THOU/MM3 (ref 0–0.1)
BILIRUB SERPL-MCNC: < 0.2 MG/DL (ref 0.3–1.2)
BUN BLDV-MCNC: 13 MG/DL (ref 7–22)
C-REACTIVE PROTEIN: 0.05 MG/DL (ref 0–1)
CALCIUM SERPL-MCNC: 9.4 MG/DL (ref 8.5–10.5)
CHLORIDE BLD-SCNC: 103 MEQ/L (ref 98–111)
CHOLESTEROL, TOTAL: 185 MG/DL (ref 100–199)
CO2: 29 MEQ/L (ref 23–33)
CREAT SERPL-MCNC: 0.7 MG/DL (ref 0.4–1.2)
EOSINOPHIL # BLD: 3.7 %
EOSINOPHILS ABSOLUTE: 0.3 THOU/MM3 (ref 0–0.4)
ERYTHROCYTE [DISTWIDTH] IN BLOOD BY AUTOMATED COUNT: 13.7 % (ref 11.5–14.5)
ERYTHROCYTE [DISTWIDTH] IN BLOOD BY AUTOMATED COUNT: 47 FL (ref 35–45)
GFR SERPL CREATININE-BSD FRML MDRD: 86 ML/MIN/1.73M2
GLUCOSE BLD-MCNC: 110 MG/DL (ref 70–108)
HCT VFR BLD CALC: 44.2 % (ref 37–47)
HDLC SERPL-MCNC: 57 MG/DL
HEMOGLOBIN: 14 GM/DL (ref 12–16)
IMMATURE GRANS (ABS): 0.03 THOU/MM3 (ref 0–0.07)
IMMATURE GRANULOCYTES: 0.4 %
LDL CHOLESTEROL CALCULATED: 104 MG/DL
LYMPHOCYTES # BLD: 30.4 %
LYMPHOCYTES ABSOLUTE: 2.5 THOU/MM3 (ref 1–4.8)
MCH RBC QN AUTO: 29.8 PG (ref 26–33)
MCHC RBC AUTO-ENTMCNC: 31.7 GM/DL (ref 32.2–35.5)
MCV RBC AUTO: 94 FL (ref 81–99)
MONOCYTES # BLD: 9.9 %
MONOCYTES ABSOLUTE: 0.8 THOU/MM3 (ref 0.4–1.3)
NUCLEATED RED BLOOD CELLS: 0 /100 WBC
PLATELET # BLD: 332 THOU/MM3 (ref 130–400)
PMV BLD AUTO: 11.1 FL (ref 9.4–12.4)
POTASSIUM SERPL-SCNC: 4.8 MEQ/L (ref 3.5–5.2)
RBC # BLD: 4.7 MILL/MM3 (ref 4.2–5.4)
SEDIMENTATION RATE, ERYTHROCYTE: 5 MM/HR (ref 0–20)
SEG NEUTROPHILS: 54.5 %
SEGMENTED NEUTROPHILS ABSOLUTE COUNT: 4.4 THOU/MM3 (ref 1.8–7.7)
SODIUM BLD-SCNC: 141 MEQ/L (ref 135–145)
TOTAL PROTEIN: 7.3 G/DL (ref 6.1–8)
TRIGL SERPL-MCNC: 122 MG/DL (ref 0–199)
TSH SERPL DL<=0.05 MIU/L-ACNC: 1.25 UIU/ML (ref 0.4–4.2)
URIC ACID: 3.5 MG/DL (ref 2.4–5.7)
WBC # BLD: 8.1 THOU/MM3 (ref 4.8–10.8)

## 2018-10-25 ENCOUNTER — TELEPHONE (OUTPATIENT)
Dept: FAMILY MEDICINE CLINIC | Age: 58
End: 2018-10-25

## 2018-10-25 DIAGNOSIS — M25.50 ARTHRALGIA, UNSPECIFIED JOINT: Primary | ICD-10-CM

## 2018-10-26 ENCOUNTER — NURSE ONLY (OUTPATIENT)
Dept: FAMILY MEDICINE CLINIC | Age: 58
End: 2018-10-26

## 2018-10-26 DIAGNOSIS — M25.50 ARTHRALGIA, UNSPECIFIED JOINT: ICD-10-CM

## 2018-10-26 LAB — RHEUMATOID FACTOR: 12 IU/ML (ref 0–13)

## 2018-10-27 LAB — ANA SCREEN: NORMAL

## 2018-10-29 ENCOUNTER — TELEPHONE (OUTPATIENT)
Dept: FAMILY MEDICINE CLINIC | Age: 58
End: 2018-10-29

## 2018-11-14 RX ORDER — DIAZEPAM 10 MG/1
5 TABLET ORAL NIGHTLY
Status: CANCELLED | OUTPATIENT
Start: 2018-11-14

## 2018-11-14 NOTE — TELEPHONE ENCOUNTER
Pt called and said that Dr Gurdeep Olguin prescribes Diazepam for her TMJ to relax at night. She would like to know if Dr Jillian Rossi could refill this medication for her as she assumed that he would refill it at her last visit here. She would fiorella someone to call her and let her know either way.      Please send to :    Vikram

## 2018-11-30 ENCOUNTER — OFFICE VISIT (OUTPATIENT)
Dept: FAMILY MEDICINE CLINIC | Age: 58
End: 2018-11-30

## 2018-11-30 VITALS
DIASTOLIC BLOOD PRESSURE: 74 MMHG | SYSTOLIC BLOOD PRESSURE: 122 MMHG | RESPIRATION RATE: 14 BRPM | BODY MASS INDEX: 26.31 KG/M2 | HEART RATE: 64 BPM | HEIGHT: 63 IN | WEIGHT: 148.5 LBS

## 2018-11-30 DIAGNOSIS — M79.7 FIBROMYALGIA: ICD-10-CM

## 2018-11-30 DIAGNOSIS — E78.01 FAMILIAL HYPERCHOLESTEROLEMIA: ICD-10-CM

## 2018-11-30 DIAGNOSIS — J06.9 VIRAL URI: ICD-10-CM

## 2018-11-30 DIAGNOSIS — M54.2 CHRONIC NECK PAIN: ICD-10-CM

## 2018-11-30 DIAGNOSIS — I10 ESSENTIAL HYPERTENSION: Primary | ICD-10-CM

## 2018-11-30 DIAGNOSIS — F34.1 DYSTHYMIA: ICD-10-CM

## 2018-11-30 DIAGNOSIS — G89.29 CHRONIC NECK PAIN: ICD-10-CM

## 2018-11-30 PROCEDURE — 99213 OFFICE O/P EST LOW 20 MIN: CPT | Performed by: FAMILY MEDICINE

## 2018-11-30 ASSESSMENT — ENCOUNTER SYMPTOMS
SINUS PRESSURE: 1
COUGH: 0
NAUSEA: 0
ABDOMINAL PAIN: 0
EYE PAIN: 0
SORE THROAT: 1
CONSTIPATION: 0
WHEEZING: 0
SHORTNESS OF BREATH: 0
HOARSE VOICE: 1
CHEST TIGHTNESS: 0

## 2019-04-01 ENCOUNTER — OFFICE VISIT (OUTPATIENT)
Dept: FAMILY MEDICINE CLINIC | Age: 59
End: 2019-04-01

## 2019-04-01 VITALS
DIASTOLIC BLOOD PRESSURE: 60 MMHG | BODY MASS INDEX: 27.2 KG/M2 | HEART RATE: 60 BPM | RESPIRATION RATE: 12 BRPM | WEIGHT: 153.5 LBS | SYSTOLIC BLOOD PRESSURE: 100 MMHG | HEIGHT: 63 IN

## 2019-04-01 DIAGNOSIS — R11.0 NAUSEA: ICD-10-CM

## 2019-04-01 DIAGNOSIS — E78.00 PURE HYPERCHOLESTEROLEMIA: ICD-10-CM

## 2019-04-01 DIAGNOSIS — I10 ESSENTIAL HYPERTENSION: Primary | ICD-10-CM

## 2019-04-01 DIAGNOSIS — M79.7 FIBROMYALGIA: ICD-10-CM

## 2019-04-01 DIAGNOSIS — F34.1 DYSTHYMIA: ICD-10-CM

## 2019-04-01 PROCEDURE — 99213 OFFICE O/P EST LOW 20 MIN: CPT | Performed by: FAMILY MEDICINE

## 2019-04-01 RX ORDER — ONDANSETRON 4 MG/1
4 TABLET, FILM COATED ORAL 3 TIMES DAILY PRN
Qty: 30 TABLET | Refills: 1 | Status: SHIPPED | OUTPATIENT
Start: 2019-04-01 | End: 2019-08-09 | Stop reason: SDUPTHER

## 2019-04-01 RX ORDER — PRAVASTATIN SODIUM 80 MG/1
TABLET ORAL
Qty: 90 TABLET | Refills: 1 | Status: SHIPPED | OUTPATIENT
Start: 2019-04-01 | End: 2019-08-09 | Stop reason: SDUPTHER

## 2019-04-01 RX ORDER — CITALOPRAM 40 MG/1
TABLET ORAL
Qty: 90 TABLET | Refills: 1 | Status: SHIPPED | OUTPATIENT
Start: 2019-04-01 | End: 2019-08-09 | Stop reason: SDUPTHER

## 2019-04-01 RX ORDER — LISINOPRIL 20 MG/1
TABLET ORAL
Qty: 90 TABLET | Refills: 1 | Status: SHIPPED | OUTPATIENT
Start: 2019-04-01 | End: 2019-08-09 | Stop reason: SDUPTHER

## 2019-04-01 ASSESSMENT — ENCOUNTER SYMPTOMS
NAUSEA: 0
CONSTIPATION: 0
SHORTNESS OF BREATH: 0
WHEEZING: 0
EYE PAIN: 0
ABDOMINAL PAIN: 0
ORTHOPNEA: 0
SORE THROAT: 0
COUGH: 0
CHEST TIGHTNESS: 0

## 2019-04-01 NOTE — PROGRESS NOTES
Subjective:      Patient ID: Irma Freitas is a 63 y.o. female. Fibromyalgia  Noted cervical and  Lumbar  Area         Fibrositis   Stable    stable     Depression    Stable       Nausea  And  With  zofran          Hyperlipidemia   This is a chronic problem. The current episode started more than 1 year ago. The problem is controlled. Recent lipid tests were reviewed and are normal. Pertinent negatives include no chest pain or shortness of breath. Current antihyperlipidemic treatment includes statins. The current treatment provides significant improvement of lipids. There are no compliance problems. Hypertension   This is a chronic problem. The current episode started more than 1 year ago. The problem has been resolved since onset. The problem is controlled. Pertinent negatives include no chest pain, headaches, neck pain, orthopnea, palpitations, peripheral edema or shortness of breath. The current treatment provides significant improvement. There are no compliance problems. Past Medical History:   Diagnosis Date    Back pain 2016    Depression     Fibromyalgia     Headache(784.0)     History of cold sores     Hyperlipidemia     Hypertension     Nausea & vomiting     PONV (postoperative nausea and vomiting)     Tobacco dependency        Review of Systems   Constitutional: Negative for appetite change, fatigue and fever. HENT: Negative for congestion, ear pain, postnasal drip and sore throat. Eyes: Negative for pain and visual disturbance. Respiratory: Negative for cough, chest tightness, shortness of breath and wheezing. Cardiovascular: Negative for chest pain, palpitations, orthopnea and leg swelling. Gastrointestinal: Negative for abdominal pain, constipation and nausea. Genitourinary: Negative for dysuria and frequency. Musculoskeletal: Negative for arthralgias, joint swelling, neck pain and neck stiffness. Skin: Negative for rash.    Neurological: Negative for dizziness, weakness, numbness and headaches. Hematological: Negative for adenopathy. Does not bruise/bleed easily. Psychiatric/Behavioral: Negative for behavioral problems and sleep disturbance. The patient is not nervous/anxious. /60 (Site: Right Upper Arm, Position: Sitting, Cuff Size: Medium Adult)   Pulse 60   Resp 12   Ht 5' 3\" (1.6 m)   Wt 153 lb 8 oz (69.6 kg)   LMP 01/06/2015   BMI 27.19 kg/m²   Objective:   Physical Exam   Constitutional: She is oriented to person, place, and time. She appears well-developed and well-nourished. HENT:   Head: Normocephalic and atraumatic. Right Ear: External ear normal.   Left Ear: External ear normal.   Nose: Nose normal.   Mouth/Throat: Oropharynx is clear and moist.   Eyes: Pupils are equal, round, and reactive to light. Conjunctivae and EOM are normal. No scleral icterus. Neck: Normal range of motion. Neck supple. No JVD present. No thyromegaly present. Cardiovascular: Normal rate, regular rhythm, normal heart sounds and intact distal pulses. Pulmonary/Chest: Effort normal and breath sounds normal. She has no wheezes. She has no rales. Abdominal: Soft. Bowel sounds are normal. She exhibits no distension and no mass. There is no tenderness. Musculoskeletal: Normal range of motion. She exhibits no tenderness. trigger points and nodules  Of  Lower  Back and  Cervical  area   Lymphadenopathy:     She has no cervical adenopathy. Neurological: She is alert and oriented to person, place, and time. She has normal reflexes. No cranial nerve deficit. Skin: Skin is warm and dry. No rash noted. Psychiatric: She has a normal mood and affect. Nursing note and vitals reviewed. Assessment:       Diagnosis Orders   1. Essential hypertension  lisinopril (PRINIVIL;ZESTRIL) 20 MG tablet   2. Pure hypercholesterolemia  pravastatin (PRAVACHOL) 80 MG tablet   3. Fibromyalgia  ondansetron (ZOFRAN) 4 MG tablet   4.  Dysthymia  citalopram (CELEXA) 40 MG

## 2019-08-09 ENCOUNTER — OFFICE VISIT (OUTPATIENT)
Dept: FAMILY MEDICINE CLINIC | Age: 59
End: 2019-08-09

## 2019-08-09 VITALS
HEIGHT: 63 IN | BODY MASS INDEX: 27.2 KG/M2 | SYSTOLIC BLOOD PRESSURE: 120 MMHG | RESPIRATION RATE: 14 BRPM | DIASTOLIC BLOOD PRESSURE: 66 MMHG | HEART RATE: 72 BPM | WEIGHT: 153.5 LBS

## 2019-08-09 DIAGNOSIS — F34.1 DYSTHYMIA: ICD-10-CM

## 2019-08-09 DIAGNOSIS — G89.29 CHRONIC RIGHT-SIDED LOW BACK PAIN WITH RIGHT-SIDED SCIATICA: ICD-10-CM

## 2019-08-09 DIAGNOSIS — G89.29 CHRONIC NECK PAIN: ICD-10-CM

## 2019-08-09 DIAGNOSIS — M79.7 FIBROMYALGIA: ICD-10-CM

## 2019-08-09 DIAGNOSIS — E78.00 PURE HYPERCHOLESTEROLEMIA: ICD-10-CM

## 2019-08-09 DIAGNOSIS — G44.219 EPISODIC TENSION-TYPE HEADACHE, NOT INTRACTABLE: ICD-10-CM

## 2019-08-09 DIAGNOSIS — I10 ESSENTIAL HYPERTENSION: Primary | ICD-10-CM

## 2019-08-09 DIAGNOSIS — M54.2 CHRONIC NECK PAIN: ICD-10-CM

## 2019-08-09 DIAGNOSIS — M54.41 CHRONIC RIGHT-SIDED LOW BACK PAIN WITH RIGHT-SIDED SCIATICA: ICD-10-CM

## 2019-08-09 PROBLEM — R11.0 NAUSEA: Status: RESOLVED | Noted: 2019-04-01 | Resolved: 2019-08-09

## 2019-08-09 PROCEDURE — 99213 OFFICE O/P EST LOW 20 MIN: CPT | Performed by: FAMILY MEDICINE

## 2019-08-09 RX ORDER — LISINOPRIL 20 MG/1
TABLET ORAL
Qty: 90 TABLET | Refills: 1 | Status: SHIPPED | OUTPATIENT
Start: 2019-08-09 | End: 2020-02-11 | Stop reason: SDUPTHER

## 2019-08-09 RX ORDER — ONDANSETRON 4 MG/1
4 TABLET, FILM COATED ORAL 3 TIMES DAILY PRN
Qty: 30 TABLET | Refills: 1 | Status: SHIPPED | OUTPATIENT
Start: 2019-08-09 | End: 2020-02-11 | Stop reason: SDUPTHER

## 2019-08-09 RX ORDER — CITALOPRAM 40 MG/1
TABLET ORAL
Qty: 90 TABLET | Refills: 1 | Status: SHIPPED | OUTPATIENT
Start: 2019-08-09 | End: 2020-02-11 | Stop reason: SDUPTHER

## 2019-08-09 RX ORDER — PRAVASTATIN SODIUM 80 MG/1
TABLET ORAL
Qty: 90 TABLET | Refills: 1 | Status: SHIPPED | OUTPATIENT
Start: 2019-08-09 | End: 2020-02-11 | Stop reason: SDUPTHER

## 2019-08-09 ASSESSMENT — ENCOUNTER SYMPTOMS
CHEST TIGHTNESS: 0
WHEEZING: 0
ORTHOPNEA: 0
SORE THROAT: 0
SHORTNESS OF BREATH: 0
NAUSEA: 0
ABDOMINAL PAIN: 0
COUGH: 0
EYE PAIN: 0
CONSTIPATION: 0

## 2019-09-11 ENCOUNTER — OFFICE VISIT (OUTPATIENT)
Dept: FAMILY MEDICINE CLINIC | Age: 59
End: 2019-09-11
Payer: COMMERCIAL

## 2019-09-11 VITALS
RESPIRATION RATE: 20 BRPM | OXYGEN SATURATION: 98 % | WEIGHT: 148 LBS | DIASTOLIC BLOOD PRESSURE: 70 MMHG | TEMPERATURE: 98.4 F | HEART RATE: 97 BPM | SYSTOLIC BLOOD PRESSURE: 110 MMHG | BODY MASS INDEX: 26.22 KG/M2

## 2019-09-11 DIAGNOSIS — K29.00 OTHER ACUTE GASTRITIS, PRESENCE OF BLEEDING UNSPECIFIED: ICD-10-CM

## 2019-09-11 DIAGNOSIS — E78.00 PURE HYPERCHOLESTEROLEMIA: ICD-10-CM

## 2019-09-11 DIAGNOSIS — K21.9 GASTROESOPHAGEAL REFLUX DISEASE, ESOPHAGITIS PRESENCE NOT SPECIFIED: ICD-10-CM

## 2019-09-11 DIAGNOSIS — I10 ESSENTIAL HYPERTENSION: ICD-10-CM

## 2019-09-11 DIAGNOSIS — M54.2 CHRONIC NECK PAIN: ICD-10-CM

## 2019-09-11 DIAGNOSIS — R11.0 NAUSEA: ICD-10-CM

## 2019-09-11 DIAGNOSIS — G89.29 CHRONIC RIGHT HIP PAIN: ICD-10-CM

## 2019-09-11 DIAGNOSIS — M25.551 CHRONIC RIGHT HIP PAIN: ICD-10-CM

## 2019-09-11 DIAGNOSIS — M79.7 FIBROMYALGIA: Primary | ICD-10-CM

## 2019-09-11 DIAGNOSIS — G89.29 CHRONIC RIGHT-SIDED LOW BACK PAIN WITH RIGHT-SIDED SCIATICA: ICD-10-CM

## 2019-09-11 DIAGNOSIS — M54.41 CHRONIC RIGHT-SIDED LOW BACK PAIN WITH RIGHT-SIDED SCIATICA: ICD-10-CM

## 2019-09-11 DIAGNOSIS — G89.29 CHRONIC NECK PAIN: ICD-10-CM

## 2019-09-11 PROCEDURE — 99214 OFFICE O/P EST MOD 30 MIN: CPT | Performed by: NURSE PRACTITIONER

## 2019-09-11 RX ORDER — OMEPRAZOLE 40 MG/1
40 CAPSULE, DELAYED RELEASE ORAL
Qty: 30 CAPSULE | Refills: 3 | Status: SHIPPED | OUTPATIENT
Start: 2019-09-11 | End: 2020-01-10

## 2019-09-11 RX ORDER — SUCRALFATE 1 G/1
1 TABLET ORAL 4 TIMES DAILY
Qty: 120 TABLET | Refills: 3 | Status: SHIPPED | OUTPATIENT
Start: 2019-09-11 | End: 2020-02-07

## 2019-09-11 NOTE — PROGRESS NOTES
20 MG tablet, take 1 tablet by mouth once daily, Disp: 90 tablet, Rfl: 1    ibuprofen (IBU) 800 MG tablet, Take 1 tablet by mouth every 8 hours as needed for Pain (Patient not taking: Reported on 2019), Disp: 100 tablet, Rfl: 0    The patientis allergic to latex and flexeril [cyclobenzaprine hcl]. Past Medical History  Theresa  has a past medical history of Back pain, Depression, Fibromyalgia, Headache(784.0), History of cold sores, Hyperlipidemia, Hypertension, Nausea, Nausea & vomiting, PONV (postoperative nausea and vomiting), and Tobacco dependency. Past Surgical History  The patient  has a past surgical history that includes Tubal ligation ();  section (); Dilation and curettage of uterus (); cervical fusion (3/00); Breast biopsy (); Lumbar spine surgery (10/11/2013); back surgery; other surgical history (6/15/2016); and Dilation and curettage of uterus (N/A, 2017). Family History  This patient's family history includes Cancer in her mother; Heart Disease in her mother; High Blood Pressure in her father and mother. Social History  Tehresa  reports that she quit smoking about 8 years ago. She started smoking about 8 years ago. She has a 45.00 pack-year smoking history. She has never used smokeless tobacco. She reports that she drinks alcohol. She reports that she does not use drugs. Health Maintenance  Health Maintenance Due   Topic Date Due    DTaP/Tdap/Td vaccine (1 - Tdap) 10/08/1980    Diabetes screen  10/08/2001    Shingles Vaccine (1 of 2) 10/08/2011    Low dose CT lung screening  10/08/2016    Cervical cancer screen  2017    Colon Cancer Screen FIT/FOBT  2018    Breast cancer screen  2019    Flu vaccine (1) 2019       Subjective:     Review of Systems   Constitutional: Negative for activity change, appetite change and fever. HENT: Negative for congestion, ear pain and rhinorrhea.     Eyes: Negative for discharge and visual

## 2019-09-12 ASSESSMENT — ENCOUNTER SYMPTOMS
ABDOMINAL PAIN: 1
EYE DISCHARGE: 0
COUGH: 0
CONSTIPATION: 0
BACK PAIN: 1
RHINORRHEA: 0
NAUSEA: 1
SHORTNESS OF BREATH: 0
CHEST TIGHTNESS: 0
DIARRHEA: 0
VOMITING: 0

## 2019-12-13 ENCOUNTER — TELEPHONE (OUTPATIENT)
Dept: FAMILY MEDICINE CLINIC | Age: 59
End: 2019-12-13

## 2020-02-04 ENCOUNTER — TELEPHONE (OUTPATIENT)
Dept: FAMILY MEDICINE CLINIC | Age: 60
End: 2020-02-04

## 2020-02-04 NOTE — TELEPHONE ENCOUNTER
Patient is asking if you will give her an order to get her labs drawn so that she can come in the office and discuss them and get medication refills.   (thyroid etc)    Please let patient know when the orders are placed and she will come in and get them drawn    Thanks

## 2020-02-07 RX ORDER — SUCRALFATE 1 G/1
TABLET ORAL
Qty: 120 TABLET | Refills: 3 | Status: SHIPPED | OUTPATIENT
Start: 2020-02-07 | End: 2020-07-14 | Stop reason: SDUPTHER

## 2020-02-10 ENCOUNTER — HOSPITAL ENCOUNTER (OUTPATIENT)
Age: 60
Setting detail: SPECIMEN
Discharge: HOME | End: 2020-02-10
Payer: COMMERCIAL

## 2020-02-10 LAB
ABSOLUTE EOS #: 0.33 K/UL (ref 0–0.44)
ABSOLUTE IMMATURE GRANULOCYTE: <0.03 K/UL (ref 0–0.3)
ABSOLUTE LYMPH #: 2.59 K/UL (ref 1.1–3.7)
ABSOLUTE MONO #: 0.68 K/UL (ref 0.1–1.2)
ALBUMIN SERPL-MCNC: 4.5 G/DL (ref 3.5–5.2)
ALBUMIN/GLOBULIN RATIO: 1.5 (ref 1–2.5)
ALP BLD-CCNC: 71 U/L (ref 35–104)
ALT SERPL-CCNC: 19 U/L (ref 5–33)
ANION GAP SERPL CALCULATED.3IONS-SCNC: 14 MMOL/L (ref 9–17)
AST SERPL-CCNC: 18 U/L
BASOPHILS # BLD: 1 % (ref 0–2)
BASOPHILS ABSOLUTE: 0.09 K/UL (ref 0–0.2)
BILIRUB SERPL-MCNC: 0.17 MG/DL (ref 0.3–1.2)
BUN BLDV-MCNC: 20 MG/DL (ref 6–20)
BUN/CREAT BLD: ABNORMAL (ref 9–20)
CALCIUM SERPL-MCNC: 9.8 MG/DL (ref 8.6–10.4)
CHLORIDE BLD-SCNC: 101 MMOL/L (ref 98–107)
CHOLESTEROL/HDL RATIO: 3.3
CHOLESTEROL: 189 MG/DL
CO2: 23 MMOL/L (ref 20–31)
CREAT SERPL-MCNC: 0.68 MG/DL (ref 0.5–0.9)
DIFFERENTIAL TYPE: ABNORMAL
EOSINOPHILS RELATIVE PERCENT: 4 % (ref 1–4)
GFR AFRICAN AMERICAN: >60 ML/MIN
GFR NON-AFRICAN AMERICAN: >60 ML/MIN
GFR SERPL CREATININE-BSD FRML MDRD: ABNORMAL ML/MIN/{1.73_M2}
GFR SERPL CREATININE-BSD FRML MDRD: ABNORMAL ML/MIN/{1.73_M2}
GLUCOSE FASTING: 111 MG/DL (ref 70–99)
HCT VFR BLD CALC: 46.7 % (ref 36.3–47.1)
HDLC SERPL-MCNC: 58 MG/DL
HEMOGLOBIN: 14.8 G/DL (ref 11.9–15.1)
IMMATURE GRANULOCYTES: 0 %
LDL CHOLESTEROL: 114 MG/DL (ref 0–130)
LYMPHOCYTES # BLD: 30 % (ref 24–43)
MCH RBC QN AUTO: 28.6 PG (ref 25.2–33.5)
MCHC RBC AUTO-ENTMCNC: 31.7 G/DL (ref 28.4–34.8)
MCV RBC AUTO: 90.3 FL (ref 82.6–102.9)
MONOCYTES # BLD: 8 % (ref 3–12)
NRBC AUTOMATED: 0 PER 100 WBC
PDW BLD-RTO: 14.1 % (ref 11.8–14.4)
PLATELET # BLD: 342 K/UL (ref 138–453)
PLATELET ESTIMATE: ABNORMAL
PMV BLD AUTO: 11.9 FL (ref 8.1–13.5)
POTASSIUM SERPL-SCNC: 4.3 MMOL/L (ref 3.7–5.3)
RBC # BLD: 5.17 M/UL (ref 3.95–5.11)
RBC # BLD: ABNORMAL 10*6/UL
SEG NEUTROPHILS: 57 % (ref 36–65)
SEGMENTED NEUTROPHILS ABSOLUTE COUNT: 5.01 K/UL (ref 1.5–8.1)
SODIUM BLD-SCNC: 138 MMOL/L (ref 135–144)
TOTAL PROTEIN: 7.6 G/DL (ref 6.4–8.3)
TRIGL SERPL-MCNC: 84 MG/DL
TSH SERPL DL<=0.05 MIU/L-ACNC: 0.83 MIU/L (ref 0.3–5)
VLDLC SERPL CALC-MCNC: NORMAL MG/DL (ref 1–30)
WBC # BLD: 8.7 K/UL (ref 3.5–11.3)
WBC # BLD: ABNORMAL 10*3/UL

## 2020-02-11 RX ORDER — ONDANSETRON 4 MG/1
4 TABLET, FILM COATED ORAL 3 TIMES DAILY PRN
Qty: 30 TABLET | Refills: 1 | Status: SHIPPED | OUTPATIENT
Start: 2020-02-11 | End: 2021-03-10 | Stop reason: SDUPTHER

## 2020-02-11 RX ORDER — CITALOPRAM 40 MG/1
TABLET ORAL
Qty: 90 TABLET | Refills: 1 | Status: SHIPPED | OUTPATIENT
Start: 2020-02-11 | End: 2020-07-14 | Stop reason: SDUPTHER

## 2020-02-11 RX ORDER — PRAVASTATIN SODIUM 80 MG/1
TABLET ORAL
Qty: 90 TABLET | Refills: 1 | Status: SHIPPED | OUTPATIENT
Start: 2020-02-11 | End: 2020-07-14 | Stop reason: SDUPTHER

## 2020-02-11 RX ORDER — LISINOPRIL 20 MG/1
TABLET ORAL
Qty: 90 TABLET | Refills: 1 | Status: SHIPPED | OUTPATIENT
Start: 2020-02-11 | End: 2020-07-14 | Stop reason: SDUPTHER

## 2020-02-17 ENCOUNTER — OFFICE VISIT (OUTPATIENT)
Dept: FAMILY MEDICINE CLINIC | Age: 60
End: 2020-02-17
Payer: COMMERCIAL

## 2020-02-17 VITALS
TEMPERATURE: 98.1 F | HEART RATE: 56 BPM | RESPIRATION RATE: 16 BRPM | OXYGEN SATURATION: 98 % | HEIGHT: 63 IN | SYSTOLIC BLOOD PRESSURE: 106 MMHG | WEIGHT: 151.4 LBS | DIASTOLIC BLOOD PRESSURE: 72 MMHG | BODY MASS INDEX: 26.82 KG/M2

## 2020-02-17 PROBLEM — R73.01 IMPAIRED FASTING GLUCOSE: Status: ACTIVE | Noted: 2020-02-17

## 2020-02-17 PROBLEM — Z12.39 SCREENING FOR BREAST CANCER: Status: ACTIVE | Noted: 2020-02-17

## 2020-02-17 PROBLEM — Z12.11 SCREENING FOR COLON CANCER: Status: ACTIVE | Noted: 2020-02-17

## 2020-02-17 PROCEDURE — 99214 OFFICE O/P EST MOD 30 MIN: CPT | Performed by: NURSE PRACTITIONER

## 2020-02-17 PROCEDURE — G0296 VISIT TO DETERM LDCT ELIG: HCPCS | Performed by: NURSE PRACTITIONER

## 2020-02-17 ASSESSMENT — ENCOUNTER SYMPTOMS
BACK PAIN: 1
SHORTNESS OF BREATH: 0
ABDOMINAL PAIN: 0
RHINORRHEA: 0
CONSTIPATION: 0
NAUSEA: 1
EYE DISCHARGE: 0
VOMITING: 0
DIARRHEA: 0
COUGH: 0
CHEST TIGHTNESS: 0

## 2020-02-17 ASSESSMENT — PATIENT HEALTH QUESTIONNAIRE - PHQ9
2. FEELING DOWN, DEPRESSED OR HOPELESS: 0
SUM OF ALL RESPONSES TO PHQ QUESTIONS 1-9: 0
SUM OF ALL RESPONSES TO PHQ9 QUESTIONS 1 & 2: 0
1. LITTLE INTEREST OR PLEASURE IN DOING THINGS: 0
SUM OF ALL RESPONSES TO PHQ QUESTIONS 1-9: 0

## 2020-02-17 NOTE — PROGRESS NOTES
[address] [facility],[address] [facility]. [place] [address] [facility]  Dept: [phone]  Dept Fax: : [phone]  Loc Fax: [phone]     Gayle Coe is a 63 y.o. female who presents today for her medical conditions/complaintsas noted below. Chief Complaint   Patient presents with    Follow-up     lab results       HPI:      Right hip pain. Onset months ago. Has been diagnosed with fibromyalgia. States that she has tried motrin and naprosyn but this did not help. States that she was told that she would need to go to pain management. Has never had xrays. States that she also has chronic upper back pain. Update States that she is not wanting to go back to PT. States she will do execises at home. Has a family member that does PT as well. Does not want flu shot. Mammogram- has not had completed in years    cologuard- has been a few years since she has completed cologuard     Has been years since she had a pap- had a D and C a couple of years. States that she was in menopuase 4 years ago. States that two years later she had a period and she had a D and C. Had completed at Dr. Shantal Tillman. IFG. States she used to drink a lot of pop. Has started keto diet a couple of months ago. No results found for: LABA1C  No results found for: EAG  Wt Readings from Last 3 Encounters:  02/17/20 : 151 lb 6.4 oz (68.7 kg)  09/11/19 : 148 lb (67.1 kg)  08/09/19 : 153 lb 8 oz (69.6 kg)      Muscle spasms. To legs. States they feel better when she is more active. Worse with standing all day. Is drinking a lot of water.        Medications:    Current Outpatient Medications:     pravastatin (PRAVACHOL) 80 MG tablet, take 1 tablet by mouth once daily, Disp: 90 tablet, Rfl: 1    ondansetron (ZOFRAN) 4 MG tablet, Take 1 tablet by mouth 3 times daily as needed for Nausea, Disp: 30 tablet, Rfl: 1    citalopram (CELEXA) 40 MG tablet, take 1 tablet by mouth once 09/01/2019       Subjective:     Review of Systems   Constitutional: Negative for activity change, appetite change and fever. HENT: Negative for congestion, ear pain and rhinorrhea. Eyes: Negative for discharge and visual disturbance. Respiratory: Negative for cough, chest tightness and shortness of breath. Cardiovascular: Negative for chest pain and palpitations. Gastrointestinal: Positive for nausea (chronic ). Negative for abdominal pain, constipation, diarrhea and vomiting. Heartburn    Genitourinary: Negative for difficulty urinating and hematuria. Musculoskeletal: Positive for arthralgias (chronic right hip. ), back pain (chronic) and myalgias. Skin: Negative for rash. Neurological: Negative for dizziness, weakness, numbness and headaches. Psychiatric/Behavioral: The patient is not nervous/anxious. Objective:     /72   Pulse 56   Temp 98.1 °F (36.7 °C) (Oral)   Resp 16   Ht 5' 3.25\" (1.607 m)   Wt 151 lb 6.4 oz (68.7 kg)   LMP 01/06/2015   SpO2 98%   Breastfeeding No   BMI 26.61 kg/m²      Physical Exam  Vitals signs reviewed. Constitutional:       Appearance: She is well-developed. HENT:      Head: Normocephalic and atraumatic. Right Ear: External ear normal.      Left Ear: External ear normal.   Eyes:      Conjunctiva/sclera: Conjunctivae normal.   Neck:      Musculoskeletal: Normal range of motion and neck supple. No spinous process tenderness. Thyroid: No thyromegaly. Trachea: Trachea normal.   Cardiovascular:      Rate and Rhythm: Normal rate and regular rhythm. Heart sounds: Normal heart sounds. No murmur. No friction rub. No gallop. Pulmonary:      Effort: Pulmonary effort is normal.      Breath sounds: Normal breath sounds. Abdominal:      General: Bowel sounds are normal.      Palpations: Abdomen is soft. Tenderness: There is no abdominal tenderness. Musculoskeletal: Normal range of motion.       Right hip: She occur. 95% of all positive results do not lead to a diagnosis of cancer. Usually further imaging can resolve most false-positive results; however, some patients may require invasive procedures. Over diagnosis risk - 10% to 12% of screen-detected lung cancer cases are over diagnosed--that is, the cancer would not have been detected in the patient's lifetime without the screening. Need for follow up screens annually to continue lung cancer screening effectiveness     Risks associated with radiation from annual LDCT- Radiation exposure is about the same as for a mammogram, which is about 1/3 of the annual background radiation exposure from everyday life. Starting screening at age 54 is not likely to increase cancer risk from radiation exposure. Patients with comorbidities resulting in life expectancy of < 10 years, or that would preclude treatment of an abnormality identified on CT, should not be screened due to lack of benefit.     To obtain maximal benefit from this screening, smoking cessation and long-term abstinence from smoking is critical

## 2020-02-17 NOTE — PATIENT INSTRUCTIONS
back with both knees bent. Your knees should be bent about 90 degrees. 2. Then push your feet into the floor, squeeze your buttocks, and lift your hips off the floor until your shoulders, hips, and knees are all in a straight line. 3. Hold for about 6 seconds as you continue to breathe normally, and then slowly lower your hips back down to the floor and rest for up to 10 seconds. 4. Repeat 8 to 12 times. Hamstring stretch (lying down)   1. Lie flat on your back with your legs straight. If you feel discomfort in your back, place a small towel roll under your lower back. 2. Holding the back of your affected leg, lift your leg straight up and toward your body until you feel a stretch at the back of your thigh. 3. Hold the stretch for at least 30 seconds. 4. Repeat 2 to 4 times. 5. Switch legs and repeat steps 1 through 4, even if only one hip is sore. Standing quadriceps stretch   1. If you are not steady on your feet, hold on to a chair, counter, or wall. You can also lie on your stomach or your side to do this exercise. 2. Bend the knee of the leg you want to stretch, and reach behind you to grab the front of your foot or ankle with the hand on the same side. For example, if you are stretching your right leg, use your right hand. 3. Keeping your knees next to each other, pull your foot toward your buttock until you feel a gentle stretch across the front of your hip and down the front of your thigh. Your knee should be pointed directly to the ground, and not out to the side. 4. Hold the stretch for at least 15 to 30 seconds. 5. Repeat 2 to 4 times. 6. Switch legs and repeat steps 1 through 5, even if only one hip is sore. Hip rotator stretch   1. Lie on your back with both knees bent and your feet flat on the floor. 2. Put the ankle of your affected leg on your opposite thigh near your knee.   3. Use your hand to gently push your knee away from your body until you feel a gentle stretch around your hip.  4. Hold the stretch for 15 to 30 seconds. 5. Repeat 2 to 4 times. 6. Repeat steps 1 through 5, but this time use your hand to gently pull your knee toward your opposite shoulder. 7. Switch legs and repeat steps 1 through 6, even if only one hip is sore. Knee-to-chest   1. Lie on your back with your knees bent and your feet flat on the floor. 2. Bring your affected leg to your chest, keeping the other foot flat on the floor (or keeping the other leg straight, whichever feels better on your lower back). 3. Keep your lower back pressed to the floor. Hold for at least 15 to 30 seconds. 4. Relax, and lower the knee to the starting position. 5. Repeat 2 to 4 times. 6. Switch legs and repeat steps 1 through 5, even if only one hip is sore. 7. To get more stretch, put your other leg flat on the floor while pulling your knee to your chest.    Clamshell   1. Lie on your side, with your affected hip on top. Keep your feet and knees together and your knees bent. 2. Raise your top knee, but keep your feet together. Do not let your hips roll back. Your legs should open up like a clamshell. 3. Hold for 6 seconds. 4. Slowly lower your knee back down. Rest for 10 seconds. 5. Repeat 8 to 12 times. 6. Switch legs and repeat steps 1 through 5, even if only one hip is sore. Follow-up care is a key part of your treatment and safety. Be sure to make and go to all appointments, and call your doctor if you are having problems. It's also a good idea to know your test results and keep a list of the medicines you take. Where can you learn more? Go to [email or web address]://[email or web address] org and sign in to your [employer] account. Enter U885 in the [place] box to learn more about \"Hip Arthritis: Exercises. \"     If you do not have an account, please click on the \"Sign Up Now\" link. Current as of: June 26, 2019  Content Version: 12.3  © 5080-2355 Healthwise, Incorporated.  Care instructions adapted under license by [facility] ([facility]). If you have questions about a medical condition or this instruction, always ask your healthcare professional. [address] any warranty or liability for your use of this information. What is lung cancer screening? Lung cancer screening is a way in which doctors check the lungs for early signs of cancer in people who have no symptoms of lung cancer. A low-dose CT scan uses much less radiation than a normal CT scan and shows a more detailed image of the lungs than a standard X-ray. The goal of lung cancer screening is to find cancer early, before it has a chance to grow, spread, or cause problems. One large study found that smokers who were screened with low-dose CT scans were less likely to die of lung cancer than those who were screened with standard X-ray. Below is a summary of the things you need to know regarding screening for lung cancer with low-dose computed tomography (LDCT). This is a screening program that involves routine annual screening with LDCT studies of the lung. The LDCTs are done using low-dose radiation that is not thought to increase your cancer risk. If you have other serious medical conditions (other cancers, congestive heart failure) that limit your life expectancy to less than 10 years, you should not undergo lung cancer screening with LDCT. The chance is 20%-60% that the LDCT result will show abnormalities. This would require additional testing which could include repeat imaging or even invasive procedures. Most (about 95%) of \"abnormal\" LDCT results are false in the sense that no lung cancer is ultimately found. Additionally, some (about 10%) of the cancers found would not affect your life expectancy, even if undetected and untreated. If you are still smoking, the single most important thing that you can do to reduce your risk of dying of lung cancer is to quit.     For this screening to be covered by Medicare and most other insurers, strict criteria must be met. If you do not meet these criteria, but still wish to undergo LDCT testing, you will be required to sign a waiver indicating your willingness to pay for the scan.

## 2020-02-18 ENCOUNTER — HOSPITAL ENCOUNTER (OUTPATIENT)
Age: 60
Setting detail: SPECIMEN
Discharge: HOME | End: 2020-02-18
Payer: COMMERCIAL

## 2020-02-19 LAB
ESTIMATED AVERAGE GLUCOSE: 120 MG/DL
HBA1C MFR BLD: 5.8 % (ref 4–6)

## 2020-03-02 ENCOUNTER — TELEPHONE (OUTPATIENT)
Dept: FAMILY MEDICINE CLINIC | Age: 60
End: 2020-03-02

## 2020-03-18 PROBLEM — Z12.39 SCREENING FOR BREAST CANCER: Status: RESOLVED | Noted: 2020-02-17 | Resolved: 2020-03-18

## 2020-07-14 ENCOUNTER — OFFICE VISIT (OUTPATIENT)
Dept: FAMILY MEDICINE CLINIC | Age: 60
End: 2020-07-14
Payer: COMMERCIAL

## 2020-07-14 VITALS
TEMPERATURE: 96.8 F | WEIGHT: 151.2 LBS | SYSTOLIC BLOOD PRESSURE: 98 MMHG | DIASTOLIC BLOOD PRESSURE: 68 MMHG | BODY MASS INDEX: 26.57 KG/M2 | OXYGEN SATURATION: 97 % | HEART RATE: 78 BPM

## 2020-07-14 LAB — HBA1C MFR BLD: 6 %

## 2020-07-14 PROCEDURE — 90471 IMMUNIZATION ADMIN: CPT | Performed by: NURSE PRACTITIONER

## 2020-07-14 PROCEDURE — 90715 TDAP VACCINE 7 YRS/> IM: CPT | Performed by: NURSE PRACTITIONER

## 2020-07-14 PROCEDURE — 99214 OFFICE O/P EST MOD 30 MIN: CPT | Performed by: NURSE PRACTITIONER

## 2020-07-14 PROCEDURE — 83036 HEMOGLOBIN GLYCOSYLATED A1C: CPT | Performed by: NURSE PRACTITIONER

## 2020-07-14 RX ORDER — PRAVASTATIN SODIUM 80 MG/1
TABLET ORAL
Qty: 90 TABLET | Refills: 1 | Status: SHIPPED | OUTPATIENT
Start: 2020-07-14 | End: 2020-09-17 | Stop reason: SDUPTHER

## 2020-07-14 RX ORDER — SUCRALFATE 1 G/1
TABLET ORAL
Qty: 120 TABLET | Refills: 3 | Status: SHIPPED | OUTPATIENT
Start: 2020-07-14 | End: 2021-03-10 | Stop reason: SDUPTHER

## 2020-07-14 RX ORDER — OMEPRAZOLE 40 MG/1
CAPSULE, DELAYED RELEASE ORAL
Qty: 30 CAPSULE | Refills: 5 | Status: SHIPPED | OUTPATIENT
Start: 2020-07-14 | Stop reason: SDUPTHER

## 2020-07-14 RX ORDER — LISINOPRIL 20 MG/1
TABLET ORAL
Qty: 90 TABLET | Refills: 1 | Status: SHIPPED | OUTPATIENT
Start: 2020-07-14 | End: 2020-07-14 | Stop reason: SDUPTHER

## 2020-07-14 RX ORDER — LISINOPRIL 10 MG/1
TABLET ORAL
Qty: 90 TABLET | Refills: 0 | Status: SHIPPED | OUTPATIENT
Start: 2020-07-14 | End: 2020-08-10 | Stop reason: DRUGHIGH

## 2020-07-14 RX ORDER — CITALOPRAM 40 MG/1
TABLET ORAL
Qty: 90 TABLET | Refills: 1 | Status: SHIPPED | OUTPATIENT
Start: 2020-07-14 | End: 2021-02-19 | Stop reason: SDUPTHER

## 2020-07-14 ASSESSMENT — ENCOUNTER SYMPTOMS
VOMITING: 0
EYE DISCHARGE: 0
CHEST TIGHTNESS: 0
DIARRHEA: 0
ABDOMINAL PAIN: 0
CONSTIPATION: 0
COUGH: 0
RHINORRHEA: 0
SHORTNESS OF BREATH: 0
BACK PAIN: 1
NAUSEA: 1

## 2020-07-14 NOTE — PROGRESS NOTES
[address] [facility],[address] [facility], [facility]. [place] [address] [facility]  Dept: [phone]  Dept Fax: : [phone]  Loc Fax: [phone]     Shahab Carmona is a 63 y.o. female who presents today for her medical conditions/complaintsas noted below. Chief Complaint   Patient presents with    Other     concerned of blood sugar issues. feels sick, headache, lightheaded etc feeling goes away once she eats    Hip Pain     constant ongoing pain in right hip, waking her up at night, etc        HPI:          Right hip pain. Onset months ago. Has been diagnosed with fibromyalgia. States that she has tried motrin and naprosyn but this did not help. States that she was told that she would need to go to pain management. xrays? Hip    States that she also has chronic upper back pain. States that she is not wanting to go back to PT. States she will do execises at home. Has a family member that does PT as well.        Care gaps? Does not want flu shot*  Mammogram- has not had completed in years- ordered. .. did she complete? covid   cologuard- has been a few years since she has completed cologuard - ordered. .. did she complete? Has it at home   Has been years since she had a pap- had a D and C a couple of years. States that she was in menopuase 4 years ago. States that two years later she had a period and PAP? she had a D and C. Had completed at Dr. Tim Alejandre. Ct of lung?- has not completed because of covid         IFG. Onset months ago  Diet? States she used to drink a lot of pop. Has started keto diet a couple of months ago. Exercise?  no  Has lows bs in which she has to eat cheese or protein        Muscle spasms. To legs. States they feel better when she is more active. Worse with standing all day. Is drinking a lot of water. gerd  Onset years ago  Taking ppi, and carafate   EGD?  No   Has taste of metalic sometimes      Medications:    Current Outpatient Medications:     citalopram (CELEXA) 40 MG tablet, take 1 tablet by mouth once daily, Disp: 90 tablet, Rfl: 1    omeprazole (PRILOSEC) 40 MG delayed release capsule, take 1 capsule by mouth once daily with breakfast, Disp: 30 capsule, Rfl: 5    pravastatin (PRAVACHOL) 80 MG tablet, take 1 tablet by mouth once daily, Disp: 90 tablet, Rfl: 1    sucralfate (CARAFATE) 1 GM tablet, take 1 tablet by mouth four times a day, Disp: 120 tablet, Rfl: 3    metFORMIN (GLUCOPHAGE) 500 MG tablet, Take 1 tablet by mouth 2 times daily (with meals), Disp: 180 tablet, Rfl: 1    lisinopril (PRINIVIL;ZESTRIL) 10 MG tablet, take 1 tablet by mouth once daily, Disp: 90 tablet, Rfl: 0    ondansetron (ZOFRAN) 4 MG tablet, Take 1 tablet by mouth 3 times daily as needed for Nausea, Disp: 30 tablet, Rfl: 1    ibuprofen (IBU) 800 MG tablet, Take 1 tablet by mouth every 8 hours as needed for Pain, Disp: 100 tablet, Rfl: 0    The patientis allergic to latex and flexeril [cyclobenzaprine hcl]. Past Medical History  Theresa  has a past medical history of Back pain, Depression, Fibromyalgia, Headache(784.0), History of cold sores, Hyperlipidemia, Hypertension, Nausea, Nausea & vomiting, PONV (postoperative nausea and vomiting), and Tobacco dependency. Past Surgical History  The patient  has a past surgical history that includes Tubal ligation ();  section (); Dilation and curettage of uterus (); cervical fusion (3/00); Breast biopsy (); Lumbar spine surgery (10/11/2013); back surgery; other surgical history (6/15/2016); and Dilation and curettage of uterus (N/A, 2017). Family History  This patient's family history includes Cancer in her mother; Heart Disease in her mother; High Blood Pressure in her father and mother. Social History  Theresa  reports that she quit smoking about 9 years ago. She started smoking about 9 years ago. She has a 45.00 pack-year smoking history.  She has never used smokeless Normal heart sounds. No murmur. No friction rub. No gallop. Pulmonary:      Effort: Pulmonary effort is normal.      Breath sounds: Normal breath sounds. Abdominal:      General: Bowel sounds are normal.      Palpations: Abdomen is soft. Tenderness: There is no abdominal tenderness. Musculoskeletal: Normal range of motion. Right hip: She exhibits tenderness and bony tenderness. She exhibits normal range of motion, normal strength, no swelling, no crepitus, no deformity and no laceration. Skin:     General: Skin is warm and dry. Findings: No erythema or rash. Comments: hypersensitive to touch to majority of neck, back and right hip   Neurological:      Mental Status: She is alert and oriented to person, place, and time. Psychiatric:         Speech: Speech normal.         Behavior: Behavior normal.         Thought Content: Thought content normal.       POCT glycosylated hemoglobin (Hb A1C)   Order: [id number]   Status:  Final result   Visible to patient:  No (not released) Dx:  Impaired fasting glucose    Ref Range & Units  10:09 4mo ago   Hemoglobin A1C  %  6.0   5.8 [address]          Specimen Collected: 07/14/20 10:09  Last Resulted: 07/14/20 10:09                  Assessment/Plan:      Theresa was seen today for other and hip pain. Diagnoses and all orders for this visit:    Impaired fasting glucose   Prediabetes- will start metformin   -     POCT glycosylated hemoglobin (Hb A1C)  -     metFORMIN (GLUCOPHAGE) 500 MG tablet;  Take 1 tablet by mouth 2 times daily (with meals)    Essential hypertension   Monitor bp at home-= will reduce bp med and see if that helps with dizziness  -     Discontinue: lisinopril (PRINIVIL;ZESTRIL) 20 MG tablet; take 1 tablet by mouth once daily  -     lisinopril (PRINIVIL;ZESTRIL) 10 MG tablet; take 1 tablet by mouth once daily    Familial hypercholesterolemia   stable  Fibromyalgia   Uncontrolled, stretching

## 2020-07-14 NOTE — PROGRESS NOTES
After obtaining consent, and per orders of Aimee Hernandez CNP, injection of 0.5mL Boostrix given in Right deltoid by [employer]. Patient instructed to remain in clinic for 20 minutes afterwards, and to report any adverse reaction to me immediately. Immunizations Administered     Name Date Dose Route    Tdap (Boostrix, Adacel) 7/14/2020 0.5 mL Intramuscular    Site: Deltoid- Right    Lot: [id number]    NDC: 64629-482-13              Pt tolerated well. VIS was given.

## 2020-07-20 ENCOUNTER — TELEPHONE (OUTPATIENT)
Dept: FAMILY MEDICINE CLINIC | Age: 60
End: 2020-07-20

## 2020-07-20 NOTE — TELEPHONE ENCOUNTER
Pt states she can't the metformin that was prescribed to her, makes her too sick, cramps; and makes her not get out of bed for 3 days. She stated she also did research on the meds and states it said it was linked to cancer and she doesn't want to be on it anymore.      Please advise

## 2020-08-10 ENCOUNTER — OFFICE VISIT (OUTPATIENT)
Dept: FAMILY MEDICINE CLINIC | Age: 60
End: 2020-08-10
Payer: COMMERCIAL

## 2020-08-10 VITALS
OXYGEN SATURATION: 100 % | HEART RATE: 51 BPM | BODY MASS INDEX: 26.26 KG/M2 | WEIGHT: 149.4 LBS | SYSTOLIC BLOOD PRESSURE: 124 MMHG | DIASTOLIC BLOOD PRESSURE: 82 MMHG | TEMPERATURE: 97.3 F

## 2020-08-10 PROCEDURE — 3017F COLORECTAL CA SCREEN DOC REV: CPT | Performed by: NURSE PRACTITIONER

## 2020-08-10 PROCEDURE — 99214 OFFICE O/P EST MOD 30 MIN: CPT | Performed by: NURSE PRACTITIONER

## 2020-08-10 PROCEDURE — G8427 DOCREV CUR MEDS BY ELIG CLIN: HCPCS | Performed by: NURSE PRACTITIONER

## 2020-08-10 PROCEDURE — 1036F TOBACCO NON-USER: CPT | Performed by: NURSE PRACTITIONER

## 2020-08-10 PROCEDURE — G8419 CALC BMI OUT NRM PARAM NOF/U: HCPCS | Performed by: NURSE PRACTITIONER

## 2020-08-10 RX ORDER — LISINOPRIL 20 MG/1
TABLET ORAL
Qty: 90 TABLET | Refills: 0 | Status: SHIPPED
Start: 2020-08-10 | End: 2021-05-10

## 2020-08-10 ASSESSMENT — ENCOUNTER SYMPTOMS
DIARRHEA: 0
RHINORRHEA: 0
BACK PAIN: 1
VOMITING: 0
CHEST TIGHTNESS: 0
CONSTIPATION: 0
SHORTNESS OF BREATH: 0
COUGH: 0
NAUSEA: 1
EYE DISCHARGE: 0
ABDOMINAL PAIN: 0

## 2020-08-10 NOTE — PROGRESS NOTES
[address] [facility],[address] [facility], [facility]. [place] [address] [facility]  Dept: [phone]  Dept Fax: : [phone]  [facility] Fax: [phone]     Ryan Noel is a 63 y.o. female who presents today for her medical conditions/complaintsas noted below. Chief Complaint   Patient presents with    6 Month Follow-Up     pt refuses pap        HPI:       Right hip pain. Onset months ago. Has been diagnosed with fibromyalgia. States that she has tried motrin and naprosyn but this did not help. States that she was told that she would need to go to pain management. xrays? Hip    States that she also has chronic upper back pain. States that she is not wanting to go back to PT. States she will do execises at home. Has a family member that does PT as well.         Care gaps? Does not want flu shot*  Mammogram- has not had completed in years- ordered. .. did she complete? covid   cologuard- has been a few years since she has completed cologuard - ordered. .. did she complete? Has it at home   Has been years since she had a pap- had a D and C a couple of years. States that she was in menopuase 4 years ago. States that two years later she had a period and PAP? she had a D and C. Had completed at Dr. Bro Recinos. Ct of lung?- has not completed because of covid     HTN  Onset years ago  Taking ace Was reduced last visit was having vertigo - however patient never reduced it         Prediabetes   Onset months ago  Diet? States she used to drink a lot of pop 2 liter a day to rare . Has started keto diet a couple of months ago. Exercise? No  Metformin? Yes started last visit, but could not   Ace- yes   Has lows bs in which she has to eat cheese or protein   BS- has been checking every two hours, usually lower than 90, at times she gets above 140         Muscle spasms. Onset weeks ago  To legs. States they feel better when she is more active.    Worse with standing all day.   Is drinking a lot of water.      gerd  Onset years ago  Taking ppi, and carafate   EGD? No   Has taste of metalic sometimes- rare      Medications:    Current Outpatient Medications:     lisinopril (PRINIVIL;ZESTRIL) 20 MG tablet, take 1 tablet by mouth once daily, Disp: 90 tablet, Rfl: 0    citalopram (CELEXA) 40 MG tablet, take 1 tablet by mouth once daily, Disp: 90 tablet, Rfl: 1    omeprazole (PRILOSEC) 40 MG delayed release capsule, take 1 capsule by mouth once daily with breakfast, Disp: 30 capsule, Rfl: 5    pravastatin (PRAVACHOL) 80 MG tablet, take 1 tablet by mouth once daily, Disp: 90 tablet, Rfl: 1    sucralfate (CARAFATE) 1 GM tablet, take 1 tablet by mouth four times a day, Disp: 120 tablet, Rfl: 3    ondansetron (ZOFRAN) 4 MG tablet, Take 1 tablet by mouth 3 times daily as needed for Nausea, Disp: 30 tablet, Rfl: 1    ibuprofen (IBU) 800 MG tablet, Take 1 tablet by mouth every 8 hours as needed for Pain, Disp: 100 tablet, Rfl: 0    The patientis allergic to latex; flexeril [cyclobenzaprine hcl]; and metformin and related. Past Medical History  Theresa  has a past medical history of Back pain, Depression, Fibromyalgia, Headache(784.0), History of cold sores, Hyperlipidemia, Hypertension, Nausea, Nausea & vomiting, PONV (postoperative nausea and vomiting), and Tobacco dependency. Past Surgical History  The patient  has a past surgical history that includes Tubal ligation ();  section (); Dilation and curettage of uterus (); cervical fusion (3/00); Breast biopsy (); Lumbar spine surgery (10/11/2013); back surgery; other surgical history (6/15/2016); and Dilation and curettage of uterus (N/A, 2017). Family History  This patient's family history includes Cancer in her mother; Heart Disease in her mother; High Blood Pressure in her father and mother. Social History  Theresa  reports that she quit smoking about 9 years ago.  She started smoking about 9 Cardiovascular:      Rate and Rhythm: Normal rate and regular rhythm. Heart sounds: Normal heart sounds. No murmur. No friction rub. No gallop. Pulmonary:      Effort: Pulmonary effort is normal.      Breath sounds: Normal breath sounds. Abdominal:      General: Bowel sounds are normal.      Palpations: Abdomen is soft. Tenderness: There is no abdominal tenderness. Musculoskeletal: Normal range of motion. Right hip: She exhibits tenderness and bony tenderness. She exhibits normal range of motion, normal strength, no swelling, no crepitus, no deformity and no laceration. Skin:     General: Skin is warm and dry. Findings: No erythema or rash. Comments: hypersensitive to touch to majority of neck, back and right hip   Neurological:      Mental Status: She is alert and oriented to person, place, and time. Psychiatric:         Speech: Speech normal.         Behavior: Behavior normal.         Thought Content: Thought content normal.         Assessment/Plan:      Theresa was seen today for 6 month follow-up. Diagnoses and all orders for this visit:    Essential hypertension  -     lisinopril (PRINIVIL;ZESTRIL) 20 MG tablet; take 1 tablet by mouth once daily    Familial hypercholesterolemia    Fibromyalgia    Gastroesophageal reflux disease, esophagitis presence not specified    Prediabetes    Chronic right-sided low back pain with right-sided sciatica    Screening for colon cancer      Encouraged on low carb diabetic diet and increase exercise  Only need to check BS in the am a couple of times a week, and if symptomatic    Has fit test that she is going to complete    Mammogram and ct of lung orders- she has to call and get scheduled    Never reduced ace, will keep at her previous dose, BP fine today and not longer having vertigo   Return in about 3 months (around 11/10/2020) for pap, fu prediabetes. HTN .     Recheck sooner if symptoms worsen or persist     Discussed use, benefit, andside effects of prescribed medications. Barriers to medication compliance addressed. All patient questions answered. Pt voiced understanding.      Electronically signedby KELSIE Lucero CNP on 8/10/2020 at 11:12 AM

## 2020-09-17 RX ORDER — PRAVASTATIN SODIUM 80 MG/1
TABLET ORAL
Qty: 90 TABLET | Refills: 1 | Status: SHIPPED | OUTPATIENT
Start: 2020-09-17 | End: 2021-03-10 | Stop reason: SDUPTHER

## 2021-01-18 DIAGNOSIS — K21.9 GASTROESOPHAGEAL REFLUX DISEASE, UNSPECIFIED WHETHER ESOPHAGITIS PRESENT: ICD-10-CM

## 2021-01-18 DIAGNOSIS — K29.00 OTHER ACUTE GASTRITIS, PRESENCE OF BLEEDING UNSPECIFIED: ICD-10-CM

## 2021-01-18 RX ORDER — OMEPRAZOLE 40 MG/1
CAPSULE, DELAYED RELEASE ORAL
Qty: 30 CAPSULE | Refills: 5 | Status: SHIPPED | OUTPATIENT
Start: 2021-01-18 | End: 2021-03-10 | Stop reason: SDUPTHER

## 2021-01-18 NOTE — TELEPHONE ENCOUNTER
[address] faxing over refill request for Prilosec 40mg  Last seen:08/10/2020  No future appointments  Rx pended.

## 2021-02-19 DIAGNOSIS — F34.1 DYSTHYMIA: ICD-10-CM

## 2021-02-19 RX ORDER — CITALOPRAM 40 MG/1
TABLET ORAL
Qty: 90 TABLET | Refills: 1 | Status: SHIPPED | OUTPATIENT
Start: 2021-02-19 | End: 2021-03-10 | Stop reason: SDUPTHER

## 2021-02-19 NOTE — TELEPHONE ENCOUNTER
Theresa Lacey called requesting a refill on the following medications:  Requested Prescriptions     Pending Prescriptions Disp Refills    citalopram (CELEXA) 40 MG tablet 90 tablet 1     Sig: take 1 tablet by mouth once daily     Pharmacy verified: [facility]      Date of last visit: 8/10/2020  Date of next visit (if applicable): 2/30/9157

## 2021-02-19 NOTE — TELEPHONE ENCOUNTER
Last appt 11/10/21 no show , 08/17/20 pt cancelled   Last appt patient showed up for 08/10/20  Upcoming appt 03/10/21  rx pended

## 2021-03-10 ENCOUNTER — HOSPITAL ENCOUNTER (OUTPATIENT)
Age: 61
Setting detail: SPECIMEN
Discharge: HOME | End: 2021-03-10
Payer: COMMERCIAL

## 2021-03-10 ENCOUNTER — OFFICE VISIT (OUTPATIENT)
Dept: FAMILY MEDICINE CLINIC | Age: 61
End: 2021-03-10
Payer: COMMERCIAL

## 2021-03-10 VITALS
SYSTOLIC BLOOD PRESSURE: 108 MMHG | OXYGEN SATURATION: 98 % | HEART RATE: 70 BPM | RESPIRATION RATE: 18 BRPM | TEMPERATURE: 96.6 F | HEIGHT: 64 IN | BODY MASS INDEX: 24.31 KG/M2 | DIASTOLIC BLOOD PRESSURE: 76 MMHG | WEIGHT: 142.4 LBS

## 2021-03-10 DIAGNOSIS — R73.03 PREDIABETES: ICD-10-CM

## 2021-03-10 DIAGNOSIS — K29.00 OTHER ACUTE GASTRITIS, PRESENCE OF BLEEDING UNSPECIFIED: ICD-10-CM

## 2021-03-10 DIAGNOSIS — Z12.11 SCREENING FOR COLON CANCER: ICD-10-CM

## 2021-03-10 DIAGNOSIS — F34.1 DYSTHYMIA: ICD-10-CM

## 2021-03-10 DIAGNOSIS — Z00.00 ANNUAL PHYSICAL EXAM: Primary | ICD-10-CM

## 2021-03-10 DIAGNOSIS — M79.7 FIBROMYALGIA: ICD-10-CM

## 2021-03-10 DIAGNOSIS — Z87.891 PERSONAL HISTORY OF TOBACCO USE: ICD-10-CM

## 2021-03-10 DIAGNOSIS — E78.00 PURE HYPERCHOLESTEROLEMIA: ICD-10-CM

## 2021-03-10 DIAGNOSIS — K21.9 GASTROESOPHAGEAL REFLUX DISEASE, UNSPECIFIED WHETHER ESOPHAGITIS PRESENT: ICD-10-CM

## 2021-03-10 DIAGNOSIS — G89.29 CHRONIC RIGHT-SIDED LOW BACK PAIN WITH RIGHT-SIDED SCIATICA: ICD-10-CM

## 2021-03-10 DIAGNOSIS — I10 ESSENTIAL HYPERTENSION: ICD-10-CM

## 2021-03-10 DIAGNOSIS — Z12.31 ENCOUNTER FOR SCREENING MAMMOGRAM FOR MALIGNANT NEOPLASM OF BREAST: ICD-10-CM

## 2021-03-10 DIAGNOSIS — M54.41 CHRONIC RIGHT-SIDED LOW BACK PAIN WITH RIGHT-SIDED SCIATICA: ICD-10-CM

## 2021-03-10 PROCEDURE — G8484 FLU IMMUNIZE NO ADMIN: HCPCS | Performed by: NURSE PRACTITIONER

## 2021-03-10 PROCEDURE — 99396 PREV VISIT EST AGE 40-64: CPT | Performed by: NURSE PRACTITIONER

## 2021-03-10 PROCEDURE — G0296 VISIT TO DETERM LDCT ELIG: HCPCS | Performed by: NURSE PRACTITIONER

## 2021-03-10 RX ORDER — OMEPRAZOLE 40 MG/1
CAPSULE, DELAYED RELEASE ORAL
Qty: 30 CAPSULE | Refills: 5 | Status: SHIPPED | OUTPATIENT
Start: 2021-03-10 | End: 2021-09-07 | Stop reason: SDUPTHER

## 2021-03-10 RX ORDER — ONDANSETRON 4 MG/1
4 TABLET, FILM COATED ORAL 3 TIMES DAILY PRN
Qty: 30 TABLET | Refills: 1 | Status: SHIPPED | OUTPATIENT
Start: 2021-03-10

## 2021-03-10 RX ORDER — PRAVASTATIN SODIUM 80 MG/1
TABLET ORAL
Qty: 90 TABLET | Refills: 1 | Status: SHIPPED | OUTPATIENT
Start: 2021-03-10 | End: 2021-03-29 | Stop reason: SDUPTHER

## 2021-03-10 RX ORDER — SUCRALFATE 1 G/1
TABLET ORAL
Qty: 120 TABLET | Refills: 3 | Status: SHIPPED | OUTPATIENT
Start: 2021-03-10 | End: 2022-05-02 | Stop reason: SDUPTHER

## 2021-03-10 RX ORDER — CITALOPRAM 40 MG/1
TABLET ORAL
Qty: 90 TABLET | Refills: 1 | Status: SHIPPED | OUTPATIENT
Start: 2021-03-10 | End: 2021-09-07 | Stop reason: SDUPTHER

## 2021-03-10 SDOH — ECONOMIC STABILITY: INCOME INSECURITY: HOW HARD IS IT FOR YOU TO PAY FOR THE VERY BASICS LIKE FOOD, HOUSING, MEDICAL CARE, AND HEATING?: NOT HARD AT ALL

## 2021-03-10 SDOH — ECONOMIC STABILITY: FOOD INSECURITY: WITHIN THE PAST 12 MONTHS, THE FOOD YOU BOUGHT JUST DIDN'T LAST AND YOU DIDN'T HAVE MONEY TO GET MORE.: NEVER TRUE

## 2021-03-10 ASSESSMENT — ENCOUNTER SYMPTOMS
RHINORRHEA: 0
DIARRHEA: 0
SHORTNESS OF BREATH: 0
COUGH: 0
CHEST TIGHTNESS: 0
ABDOMINAL PAIN: 0
VOMITING: 0
NAUSEA: 1
EYE DISCHARGE: 0
BACK PAIN: 1
CONSTIPATION: 0

## 2021-03-10 NOTE — PROGRESS NOTES
[address] [facility],[address] [facility], [facility] [place]. [place] [address] [facility]  Dept: [phone]  Dept Fax: : [phone]  [address] [place] [address] Fax: [phone]     Visit type: Established patient    Reason for Visit: Hypertension (7 month follow up), Medication Refill (refills), and Health Maintenance (Pap Smear- has been a couple years, wants to schedule here at another time; Cologua)      Assessment and Plan       1. Annual physical exam  2. Dysthymia  -     citalopram (CELEXA) 40 MG tablet; take 1 tablet by mouth once daily, Disp-90 tablet, R-1Normal  3. Gastroesophageal reflux disease, unspecified whether esophagitis present  -     omeprazole (PRILOSEC) 40 MG delayed release capsule; take 1 capsule by mouth once daily with breakfast, Disp-30 capsule, R-5Normal  -     sucralfate (CARAFATE) 1 GM tablet; take 1 tablet by mouth four times a day, Disp-120 tablet, R-3Normal  4. Essential hypertension  5. Pure hypercholesterolemia  -     pravastatin (PRAVACHOL) 80 MG tablet; take 1 tablet by mouth once daily, Disp-90 tablet, R-1Normal  6. Fibromyalgia  -     ondansetron (ZOFRAN) 4 MG tablet; Take 1 tablet by mouth 3 times daily as needed for Nausea, Disp-30 tablet, R-1Normal  7. Prediabetes  -     CBC Auto Differential; Future  -     Hemoglobin A1C; Future  -     Magnesium; Future  -     TSH with Reflex; Future  -     Vitamin D 25 Hydroxy; Future  -     Lipid Panel; Future  -     Comprehensive Metabolic Panel; Future  8. Chronic right-sided low back pain with right-sided sciatica  9. Screening for colon cancer  -     [facility] (For External Results Only); Future  10. Other acute gastritis, presence of bleeding unspecified  -     omeprazole (PRILOSEC) 40 MG delayed release capsule; take 1 capsule by mouth once daily with breakfast, Disp-30 capsule, R-5Normal  -     sucralfate (CARAFATE) 1 GM tablet; take 1 tablet by mouth four times a day, Disp-120 tablet, R-3Normal  11.  Personal history of tobacco use  -     MT VISIT TO DISCUSS LUNG CA SCREEN W LDCT  -     CT Lung Screen (Annual); Future  12. Encounter for screening mammogram for malignant neoplasm of breast  -     LISA DIGITAL SCREEN W OR WO CAD BILATERAL; Future    Need labs completed  Is going to get mammogram and ct this summer    Continue current medication     is with her today and wants her to consider medical marijuana for her fibromyalgia     Return in about 6 months (around 9/10/2021) for PAP- fu chronic issues . Subjective       Here for annual exam and med refills    fibromyalgia. Onset years ago   motrin and naprosyn but this did not help.  pain management. no   xrays? Hip   Areas affected- upper back and hip   Doing home exercises          Care gaps? Does not want flu shot*  Mammogram- has not had completed in years- ordered. .. did she complete? covid- has not gotten done is going to get done in summer   cologuard- has been a few years since she has completed cologuard - ordered. .. did she complete? Wants another one sent   Has been years since she had a pap- had a D and C a couple of years. States that she was in menopuase 4 years ago. States that two years later she had a period and PAP? she had a D and C. Had completed at Dr. Turk. Ct of lung?- has not completed because of covid - is going to do that this summer     HTN  Onset years ago  Taking ace     Prediabetes   Onset months ago  Diet? States she used to drink a lot of pop 2 liter a day to rare . Has started keto diet a couple of months ago. Exercise? No  Metformin? Yes started last visit, but could not   Ace- yes   Has lows bs in which she has to eat cheese or protein   BS- has been checking every two hours, usually lower than 90, at times she gets above 140      Muscle spasms. Onset weeks ago  To legs. States they feel better when she is more active. Worse with standing all day.    Is drinking a lot of water.      gerd  Onset years ago  Taking ppi, and carafate   EGD? No   Has taste of metalic sometimes- rare       Review of Systems   Constitutional: Negative for activity change, appetite change and fever. HENT: Negative for congestion, ear pain and rhinorrhea. Eyes: Negative for discharge and visual disturbance. Respiratory: Negative for cough, chest tightness and shortness of breath. Cardiovascular: Negative for chest pain and palpitations. Gastrointestinal: Positive for nausea (chronic ). Negative for abdominal pain, constipation, diarrhea and vomiting. Heartburn    Genitourinary: Negative for difficulty urinating and hematuria. Musculoskeletal: Positive for arthralgias (chronic right hip. ), back pain (chronic) and myalgias. Skin: Negative for rash. Neurological: Negative for dizziness, weakness, numbness and headaches. Psychiatric/Behavioral: The patient is not nervous/anxious.          Allergies   Allergen Reactions    Latex Dermatitis     From the powder in the gloves    Flexeril [Cyclobenzaprine Hcl] Other (See Comments)     \"feel like bugs crawling all over me\"    Metformin And Related Other (See Comments)     Gi cramps, nausea       Outpatient Medications Prior to Visit   Medication Sig Dispense Refill    lisinopril (PRINIVIL;ZESTRIL) 20 MG tablet take 1 tablet by mouth once daily 90 tablet 0    citalopram (CELEXA) 40 MG tablet take 1 tablet by mouth once daily 90 tablet 1    omeprazole (PRILOSEC) 40 MG delayed release capsule take 1 capsule by mouth once daily with breakfast 30 capsule 5    pravastatin (PRAVACHOL) 80 MG tablet take 1 tablet by mouth once daily 90 tablet 1    sucralfate (CARAFATE) 1 GM tablet take 1 tablet by mouth four times a day 120 tablet 3    ondansetron (ZOFRAN) 4 MG tablet Take 1 tablet by mouth 3 times daily as needed for Nausea 30 tablet 1    ibuprofen (IBU) 800 MG tablet Take 1 tablet by mouth every 8 hours as needed for Pain (Patient not taking: Reported on 3/10/2021) 100 tablet 0     No facility-administered medications prior to visit. Past Medical History:   Diagnosis Date    Back pain 2016    Depression     Fibromyalgia     Headache(784.0)     History of cold sores     Hyperlipidemia     Hypertension     Nausea 2019    Nausea & vomiting     PONV (postoperative nausea and vomiting)     Tobacco dependency         Social History     Tobacco Use    Smoking status: Former Smoker     Packs/day: 1.50     Years: 30.00     Pack years: 45.00     Start date: 2010     Quit date:      Years since quitting: 10.1    Smokeless tobacco: Never Used   Substance Use Topics    Alcohol use: Yes     Comment: Rarely         Past Surgical History:   Procedure Laterality Date    BACK SURGERY      BREAST BIOPSY      right; negative    CERVICAL FUSION  3/00    C5-6     SECTION      DILATION AND CURETTAGE OF UTERUS      DILATION AND CURETTAGE OF UTERUS N/A 2017    DILATATION AND CURETTAGE HYSTEROSCOPY performed by Cort Bosworth, DO at [facility]  10/11/2013    L 4-5 Decompression and Posterior Fusion    OTHER SURGICAL HISTORY  6/15/2016    EXTRACTIONS TEETH # 3-4-18-31    TUBAL LIGATION  1988       Family History   Problem Relation Age of Onset    Cancer Mother         lymphoma    High Blood Pressure Mother     Heart Disease Mother     High Blood Pressure Father        Objective       /76 (Site: Left Upper Arm, Position: Sitting, Cuff Size: Medium Adult)   Pulse 70   Temp 96.6 °F (35.9 °C) (Temporal)   Resp 18   Ht 5' 3.5\" (1.613 m)   Wt 142 lb 6.4 oz (64.6 kg)   LMP 2015   SpO2 98%   BMI 24.83 kg/m²   Physical Exam  Vitals signs reviewed. Constitutional:       Appearance: She is well-developed. HENT:      Head: Normocephalic and atraumatic.       Right Ear: External ear normal.      Left Ear: External ear normal.   Eyes:      Conjunctiva/sclera: Conjunctivae normal.   Neck:      Musculoskeletal: Normal range of motion and neck supple. No spinous process tenderness. Thyroid: No thyromegaly. Trachea: Trachea normal.   Cardiovascular:      Rate and Rhythm: Normal rate and regular rhythm. Heart sounds: Normal heart sounds. No murmur. No friction rub. No gallop. Pulmonary:      Effort: Pulmonary effort is normal.      Breath sounds: Normal breath sounds. Abdominal:      General: Bowel sounds are normal.      Palpations: Abdomen is soft. Tenderness: There is no abdominal tenderness. Musculoskeletal: Normal range of motion. Skin:     General: Skin is warm and dry. Findings: No erythema or rash. Neurological:      Mental Status: She is alert and oriented to person, place, and time. Psychiatric:         Speech: Speech normal.         Behavior: Behavior normal.         Thought Content: Thought content normal.           Data Reviewed and Summarized       Labs:     Imaging/Testing:        KELSIE JOHNSON - CNP  Low Dose CT (LDCT) Lung Screening criteria met   Age 50-69   Pack year smoking >30   Still smoking or less than 15 year since quit   No sign or symptoms of lung cancer   > 11 months since last LDCT     Risks and benefits of lung cancer screening with LDCT scans discussed:    Significance of positive screen - False-positive LDCT results often occur. 95% of all positive results do not lead to a diagnosis of cancer. Usually further imaging can resolve most false-positive results; however, some patients may require invasive procedures. Over diagnosis risk - 10% to 12% of screen-detected lung cancer cases are over diagnosed--that is, the cancer would not have been detected in the patient's lifetime without the screening.     Need for follow up screens annually to continue lung cancer screening effectiveness     Risks associated with radiation from annual LDCT- Radiation exposure is about the same as for a mammogram, which is about 1/3 of the annual background radiation exposure from everyday life. Starting screening at age 54 is not likely to increase cancer risk from radiation exposure. Patients with comorbidities resulting in life expectancy of < 10 years, or that would preclude treatment of an abnormality identified on CT, should not be screened due to lack of benefit.     To obtain maximal benefit from this screening, smoking cessation and long-term abstinence from smoking is critical

## 2021-03-11 LAB
ABSOLUTE EOS #: 0.29 K/UL (ref 0–0.44)
ABSOLUTE IMMATURE GRANULOCYTE: <0.03 K/UL (ref 0–0.3)
ABSOLUTE LYMPH #: 2.98 K/UL (ref 1.1–3.7)
ABSOLUTE MONO #: 0.76 K/UL (ref 0.1–1.2)
ALBUMIN SERPL-MCNC: 4.3 G/DL (ref 3.5–5.2)
ALBUMIN/GLOBULIN RATIO: 1.5 (ref 1–2.5)
ALP BLD-CCNC: 60 U/L (ref 35–104)
ALT SERPL-CCNC: 9 U/L (ref 5–33)
ANION GAP SERPL CALCULATED.3IONS-SCNC: 9 MMOL/L (ref 9–17)
AST SERPL-CCNC: 14 U/L
BASOPHILS # BLD: 1 % (ref 0–2)
BASOPHILS ABSOLUTE: 0.1 K/UL (ref 0–0.2)
BILIRUB SERPL-MCNC: 0.19 MG/DL (ref 0.3–1.2)
BUN BLDV-MCNC: 19 MG/DL (ref 6–20)
BUN/CREAT BLD: ABNORMAL (ref 9–20)
CALCIUM SERPL-MCNC: 9.6 MG/DL (ref 8.6–10.4)
CHLORIDE BLD-SCNC: 100 MMOL/L (ref 98–107)
CHOLESTEROL/HDL RATIO: 2.9
CHOLESTEROL: 178 MG/DL
CO2: 28 MMOL/L (ref 20–31)
CREAT SERPL-MCNC: 0.76 MG/DL (ref 0.5–0.9)
DIFFERENTIAL TYPE: NORMAL
EOSINOPHILS RELATIVE PERCENT: 3 % (ref 1–4)
ESTIMATED AVERAGE GLUCOSE: 117 MG/DL
GFR AFRICAN AMERICAN: >60 ML/MIN
GFR NON-AFRICAN AMERICAN: >60 ML/MIN
GFR SERPL CREATININE-BSD FRML MDRD: ABNORMAL ML/MIN/{1.73_M2}
GFR SERPL CREATININE-BSD FRML MDRD: ABNORMAL ML/MIN/{1.73_M2}
GLUCOSE BLD-MCNC: 92 MG/DL (ref 70–99)
HBA1C MFR BLD: 5.7 % (ref 4–6)
HCT VFR BLD CALC: 42.6 % (ref 36.3–47.1)
HDLC SERPL-MCNC: 62 MG/DL
HEMOGLOBIN: 13.1 G/DL (ref 11.9–15.1)
IMMATURE GRANULOCYTES: 0 %
LDL CHOLESTEROL: 97 MG/DL (ref 0–130)
LYMPHOCYTES # BLD: 32 % (ref 24–43)
MAGNESIUM: 2.1 MG/DL (ref 1.6–2.6)
MCH RBC QN AUTO: 29.2 PG (ref 25.2–33.5)
MCHC RBC AUTO-ENTMCNC: 30.8 G/DL (ref 28.4–34.8)
MCV RBC AUTO: 94.9 FL (ref 82.6–102.9)
MONOCYTES # BLD: 8 % (ref 3–12)
NRBC AUTOMATED: 0 PER 100 WBC
PDW BLD-RTO: 13.1 % (ref 11.8–14.4)
PLATELET # BLD: 327 K/UL (ref 138–453)
PLATELET ESTIMATE: NORMAL
PMV BLD AUTO: 11.4 FL (ref 8.1–13.5)
POTASSIUM SERPL-SCNC: 4.6 MMOL/L (ref 3.7–5.3)
RBC # BLD: 4.49 M/UL (ref 3.95–5.11)
RBC # BLD: NORMAL 10*6/UL
SEG NEUTROPHILS: 56 % (ref 36–65)
SEGMENTED NEUTROPHILS ABSOLUTE COUNT: 5.07 K/UL (ref 1.5–8.1)
SODIUM BLD-SCNC: 137 MMOL/L (ref 135–144)
TOTAL PROTEIN: 7.2 G/DL (ref 6.4–8.3)
TRIGL SERPL-MCNC: 95 MG/DL
TSH SERPL DL<=0.05 MIU/L-ACNC: 1.6 MIU/L (ref 0.3–5)
VITAMIN D 25-HYDROXY: 24.3 NG/ML (ref 30–100)
VLDLC SERPL CALC-MCNC: NORMAL MG/DL (ref 1–30)
WBC # BLD: 9.2 K/UL (ref 3.5–11.3)
WBC # BLD: NORMAL 10*3/UL

## 2021-03-29 DIAGNOSIS — E78.00 PURE HYPERCHOLESTEROLEMIA: ICD-10-CM

## 2021-03-29 RX ORDER — PRAVASTATIN SODIUM 80 MG/1
TABLET ORAL
Qty: 90 TABLET | Refills: 1 | Status: SHIPPED | OUTPATIENT
Start: 2021-03-29 | End: 2021-11-02

## 2021-03-29 NOTE — TELEPHONE ENCOUNTER
Theresa Lacey called requesting a refill on the following medications:  Requested Prescriptions     Pending Prescriptions Disp Refills    pravastatin (PRAVACHOL) 80 MG tablet 90 tablet 1     Sig: take 1 tablet by mouth once daily     [facility]   . pv      Date of last visit: 3/20/21  Date of next visit (if applicable): 7/40/1222

## 2021-05-08 DIAGNOSIS — I10 ESSENTIAL HYPERTENSION: ICD-10-CM

## 2021-05-10 DIAGNOSIS — I10 ESSENTIAL HYPERTENSION: ICD-10-CM

## 2021-05-10 RX ORDER — LISINOPRIL 20 MG/1
TABLET ORAL
Qty: 90 TABLET | Refills: 0 | Status: SHIPPED | OUTPATIENT
Start: 2021-05-10 | End: 2021-08-16 | Stop reason: SDUPTHER

## 2021-05-10 RX ORDER — LISINOPRIL 20 MG/1
TABLET ORAL
Qty: 90 TABLET | Refills: 0 | OUTPATIENT
Start: 2021-05-10

## 2021-05-10 NOTE — TELEPHONE ENCOUNTER
Theresa Lacey called requesting a refill on the following medications:  Requested Prescriptions     Pending Prescriptions Disp Refills    lisinopril (PRINIVIL;ZESTRIL) 20 MG tablet 90 tablet 0     Pharmacy verified[facility]      Date of last visit:   Date of next visit (if applicable): 5/19/3827

## 2021-07-14 ENCOUNTER — TELEPHONE (OUTPATIENT)
Dept: FAMILY MEDICINE CLINIC | Age: 61
End: 2021-07-14

## 2021-07-14 NOTE — TELEPHONE ENCOUNTER
Spoke to pt. Informed her order has been faxed to [facility]. Central Scheduling number also given to pt.  Pt stated verbal understanding

## 2021-07-14 NOTE — TELEPHONE ENCOUNTER
----- Message from Alayna Lni sent at 7/14/2021 12:25 PM EDT -----  Subject: Message to Provider    QUESTIONS  Information for Provider? Pt is requesting a Mammogram order to be sent to   [facility]. She would like a call back to discuss. ---------------------------------------------------------------------------  --------------  Deannie Sandhoff INFO  What is the best way for the office to contact you? OK to leave message on   voicemail  Preferred Call Back Phone Number? [phone]  ---------------------------------------------------------------------------  --------------  SCRIPT ANSWERS  Relationship to Patient?  Self

## 2021-08-16 DIAGNOSIS — I10 ESSENTIAL HYPERTENSION: ICD-10-CM

## 2021-08-16 RX ORDER — LISINOPRIL 20 MG/1
TABLET ORAL
Qty: 90 TABLET | Refills: 0 | Status: SHIPPED | OUTPATIENT
Start: 2021-08-16 | End: 2021-11-23 | Stop reason: SDUPTHER

## 2021-09-07 DIAGNOSIS — K29.00 OTHER ACUTE GASTRITIS, PRESENCE OF BLEEDING UNSPECIFIED: ICD-10-CM

## 2021-09-07 DIAGNOSIS — F34.1 DYSTHYMIA: ICD-10-CM

## 2021-09-07 DIAGNOSIS — K21.9 GASTROESOPHAGEAL REFLUX DISEASE, UNSPECIFIED WHETHER ESOPHAGITIS PRESENT: ICD-10-CM

## 2021-09-07 RX ORDER — CITALOPRAM 40 MG/1
TABLET ORAL
Qty: 90 TABLET | Refills: 1 | Status: SHIPPED | OUTPATIENT
Start: 2021-09-07 | End: 2022-10-12

## 2021-09-07 RX ORDER — OMEPRAZOLE 40 MG/1
CAPSULE, DELAYED RELEASE ORAL
Qty: 30 CAPSULE | Refills: 5 | Status: SHIPPED | OUTPATIENT
Start: 2021-09-07 | End: 2022-01-13 | Stop reason: ALTCHOICE

## 2021-09-13 ENCOUNTER — OFFICE VISIT (OUTPATIENT)
Dept: FAMILY MEDICINE CLINIC | Age: 61
End: 2021-09-13
Payer: COMMERCIAL

## 2021-09-13 VITALS
TEMPERATURE: 97.3 F | WEIGHT: 142.8 LBS | OXYGEN SATURATION: 97 % | HEART RATE: 66 BPM | DIASTOLIC BLOOD PRESSURE: 80 MMHG | BODY MASS INDEX: 24.9 KG/M2 | RESPIRATION RATE: 16 BRPM | SYSTOLIC BLOOD PRESSURE: 110 MMHG

## 2021-09-13 DIAGNOSIS — Z28.21 COVID-19 VACCINE DOSE DECLINED: ICD-10-CM

## 2021-09-13 DIAGNOSIS — M54.41 CHRONIC RIGHT-SIDED LOW BACK PAIN WITH RIGHT-SIDED SCIATICA: ICD-10-CM

## 2021-09-13 DIAGNOSIS — M79.7 FIBROMYALGIA: ICD-10-CM

## 2021-09-13 DIAGNOSIS — G89.29 CHRONIC RIGHT-SIDED LOW BACK PAIN WITH RIGHT-SIDED SCIATICA: ICD-10-CM

## 2021-09-13 DIAGNOSIS — K21.9 GASTROESOPHAGEAL REFLUX DISEASE, UNSPECIFIED WHETHER ESOPHAGITIS PRESENT: Primary | ICD-10-CM

## 2021-09-13 DIAGNOSIS — E55.9 VITAMIN D INSUFFICIENCY: ICD-10-CM

## 2021-09-13 DIAGNOSIS — E78.00 PURE HYPERCHOLESTEROLEMIA: ICD-10-CM

## 2021-09-13 DIAGNOSIS — I10 ESSENTIAL HYPERTENSION: ICD-10-CM

## 2021-09-13 DIAGNOSIS — R73.03 PREDIABETES: ICD-10-CM

## 2021-09-13 PROCEDURE — 1036F TOBACCO NON-USER: CPT | Performed by: NURSE PRACTITIONER

## 2021-09-13 PROCEDURE — G8427 DOCREV CUR MEDS BY ELIG CLIN: HCPCS | Performed by: NURSE PRACTITIONER

## 2021-09-13 PROCEDURE — 3017F COLORECTAL CA SCREEN DOC REV: CPT | Performed by: NURSE PRACTITIONER

## 2021-09-13 PROCEDURE — G8420 CALC BMI NORM PARAMETERS: HCPCS | Performed by: NURSE PRACTITIONER

## 2021-09-13 PROCEDURE — 99214 OFFICE O/P EST MOD 30 MIN: CPT | Performed by: NURSE PRACTITIONER

## 2021-09-13 NOTE — PROGRESS NOTES
[address] [facility] [place]. [facility] [address]  Dept: [phone]  Dept Fax: [phone]    Visit type: Established patient    Reason for Visit: Hypertension (6 month follow up)         Assessment and Plan       1. Gastroesophageal reflux disease, unspecified whether esophagitis present  2. Essential hypertension  3. Fibromyalgia  4. Prediabetes  -     Hemoglobin A1C; Future  5. Chronic right-sided low back pain with right-sided sciatica  6. Pure hypercholesterolemia  7. Vitamin D insufficiency  -     Vitamin D 25 Hydroxy; Future  8. COVID-19 vaccine dose declined      Return in about 6 months (around 3/13/2022) for Anual exam.       Subjective       Fibromyalgia. Onset years ago   motrin and naprosyn but this did not help.  pain management. no   xrays? Hip   Areas affected- upper back and hip   Doing home exercises       Care gaps? Does not want flu shot*  Mammogram- had completed    PAP- due   Ct of lung?- has not completed because of covid - is going to do that this summer     HTN  Onset years ago  Taking ace     Prediabetes   Onset months ago  Diet? Low carb   Exercise? No  Metformin? flet ill   Ace- yes   Rare monitor BS at home      Muscle spasms. Onset weeks ago  To legs. States they feel better when she is more active. Worse with standing all day. Is drinking a lot of water.      gerd  Onset years ago  Taking ppi, and carafate   EGD? No   Has taste of metalic sometimes- rare    Has cologuard at home and is going to complete       Review of Systems   Constitutional: Negative for activity change, appetite change and fever. HENT: Negative for congestion, ear pain and rhinorrhea. Eyes: Negative for discharge and visual disturbance. Respiratory: Negative for cough, chest tightness and shortness of breath. Cardiovascular: Negative for chest pain and palpitations. Gastrointestinal: Positive for nausea (chronic ).  Negative for abdominal pain, constipation, diarrhea and vomiting. Heartburn    Genitourinary: Negative for difficulty urinating and hematuria. Musculoskeletal: Positive for arthralgias (chronic right hip. ), back pain (chronic) and myalgias. Skin: Negative for rash. Neurological: Negative for dizziness, weakness, numbness and headaches. Psychiatric/Behavioral: The patient is not nervous/anxious. Allergies   Allergen Reactions    Latex Dermatitis     From the powder in the gloves    Flexeril [Cyclobenzaprine Hcl] Other (See Comments)     \"feel like bugs crawling all over me\"    Metformin And Related Other (See Comments)     Gi cramps, nausea       Outpatient Medications Prior to Visit   Medication Sig Dispense Refill    citalopram (CELEXA) 40 MG tablet take 1 tablet by mouth once daily 90 tablet 1    omeprazole (PRILOSEC) 40 MG delayed release capsule take 1 capsule by mouth once daily with breakfast 30 capsule 5    lisinopril (PRINIVIL;ZESTRIL) 20 MG tablet Take 1 tablet by mouth daily 90 tablet 0    pravastatin (PRAVACHOL) 80 MG tablet take 1 tablet by mouth once daily 90 tablet 1    ondansetron (ZOFRAN) 4 MG tablet Take 1 tablet by mouth 3 times daily as needed for Nausea 30 tablet 1    sucralfate (CARAFATE) 1 GM tablet take 1 tablet by mouth four times a day 120 tablet 3     No facility-administered medications prior to visit. Past Medical History:   Diagnosis Date    Back pain 2016    Depression     Fibromyalgia     Headache(784.0)     History of cold sores     Hyperlipidemia     Hypertension     Nausea 4/1/2019    Nausea & vomiting     PONV (postoperative nausea and vomiting)     Tobacco dependency         Social History     Tobacco Use    Smoking status: Former Smoker     Packs/day: 1.50     Years: 30.00     Pack years: 45.00     Start date: 9/19/2010     Quit date: 2011     Years since quitting: 10.7    Smokeless tobacco: Never Used   Substance Use Topics    Alcohol use:  Yes Comment: Rarely         Past Surgical History:   Procedure Laterality Date    BACK SURGERY      BREAST BIOPSY      right; negative    CERVICAL FUSION  3/00    C5-6     SECTION      DILATION AND CURETTAGE OF UTERUS      DILATION AND CURETTAGE OF UTERUS N/A 2017    DILATATION AND CURETTAGE HYSTEROSCOPY performed by Dayana Barbosa DO at [address] [place] [address]  10/11/2013    L 4-5 Decompression and Posterior Fusion    OTHER SURGICAL HISTORY  6/15/2016    EXTRACTIONS TEETH # 3-4-18-31    TUBAL LIGATION         Family History   Problem Relation Age of Onset    Cancer Mother         lymphoma    High Blood Pressure Mother     Heart Disease Mother     High Blood Pressure Father        Objective       /80 (Site: Left Upper Arm, Position: Sitting, Cuff Size: Large Adult) Comment: manual  Pulse 66   Temp 97.3 °F (36.3 °C) (Temporal)   Resp 16   Wt 142 lb 12.8 oz (64.8 kg)   LMP 2015   SpO2 97%   BMI 24.90 kg/m²   Physical Exam  Vitals reviewed. Constitutional:       Appearance: She is well-developed. HENT:      Head: Normocephalic and atraumatic. Right Ear: External ear normal.      Left Ear: External ear normal.   Eyes:      Conjunctiva/sclera: Conjunctivae normal.   Neck:      Thyroid: No thyromegaly. Trachea: Trachea normal.   Cardiovascular:      Rate and Rhythm: Normal rate and regular rhythm. Heart sounds: Normal heart sounds. No murmur heard. No friction rub. No gallop. Pulmonary:      Effort: Pulmonary effort is normal.      Breath sounds: Normal breath sounds. Abdominal:      General: Bowel sounds are normal.      Palpations: Abdomen is soft. Tenderness: There is no abdominal tenderness. Musculoskeletal:         General: Normal range of motion. Cervical back: Normal range of motion and neck supple. No spinous process tenderness. Skin:     General: Skin is warm and dry. Findings: No erythema or rash. Neurological:      Mental Status: She is alert and oriented to person, place, and time. Psychiatric:         Speech: Speech normal.         Behavior: Behavior normal.         Thought Content:  Thought content normal.           Data Reviewed and Summarized       Labs:     Imaging/Testing:        KELSIE Guidry - CNP

## 2021-09-20 ASSESSMENT — ENCOUNTER SYMPTOMS
RHINORRHEA: 0
BACK PAIN: 1
ABDOMINAL PAIN: 0
COUGH: 0
CHEST TIGHTNESS: 0
VOMITING: 0
NAUSEA: 1
EYE DISCHARGE: 0
CONSTIPATION: 0
SHORTNESS OF BREATH: 0
DIARRHEA: 0

## 2021-11-02 DIAGNOSIS — E78.00 PURE HYPERCHOLESTEROLEMIA: ICD-10-CM

## 2021-11-02 RX ORDER — PRAVASTATIN SODIUM 80 MG/1
TABLET ORAL
Qty: 90 TABLET | Refills: 1 | Status: SHIPPED | OUTPATIENT
Start: 2021-11-02 | End: 2022-05-26

## 2021-11-23 DIAGNOSIS — I10 ESSENTIAL HYPERTENSION: ICD-10-CM

## 2021-11-23 RX ORDER — LISINOPRIL 20 MG/1
TABLET ORAL
Qty: 90 TABLET | Refills: 0 | Status: SHIPPED | OUTPATIENT
Start: 2021-11-23 | End: 2022-03-07

## 2021-11-23 NOTE — TELEPHONE ENCOUNTER
Patient called office requesting medication refill   Last appt 09/13/21  Upcoming appt 03/14/22  rx pended

## 2022-01-13 DIAGNOSIS — K21.9 GASTROESOPHAGEAL REFLUX DISEASE, UNSPECIFIED WHETHER ESOPHAGITIS PRESENT: Primary | ICD-10-CM

## 2022-01-13 RX ORDER — ESOMEPRAZOLE MAGNESIUM 40 MG/1
40 CAPSULE, DELAYED RELEASE ORAL
Qty: 30 CAPSULE | Refills: 3 | Status: SHIPPED | OUTPATIENT
Start: 2022-01-13 | End: 2022-05-26

## 2022-03-07 DIAGNOSIS — I10 ESSENTIAL HYPERTENSION: ICD-10-CM

## 2022-03-07 RX ORDER — LISINOPRIL 20 MG/1
TABLET ORAL
Qty: 90 TABLET | Refills: 0 | Status: SHIPPED | OUTPATIENT
Start: 2022-03-07 | End: 2022-06-17 | Stop reason: SDUPTHER

## 2022-03-22 ENCOUNTER — HOSPITAL ENCOUNTER (OUTPATIENT)
Age: 62
Setting detail: SPECIMEN
Discharge: HOME | End: 2022-03-22

## 2022-03-22 ENCOUNTER — OFFICE VISIT (OUTPATIENT)
Dept: FAMILY MEDICINE CLINIC | Age: 62
End: 2022-03-22
Payer: COMMERCIAL

## 2022-03-22 VITALS
RESPIRATION RATE: 18 BRPM | BODY MASS INDEX: 26.05 KG/M2 | HEART RATE: 51 BPM | HEIGHT: 63 IN | OXYGEN SATURATION: 97 % | TEMPERATURE: 96.8 F | WEIGHT: 147 LBS | DIASTOLIC BLOOD PRESSURE: 68 MMHG | SYSTOLIC BLOOD PRESSURE: 110 MMHG

## 2022-03-22 DIAGNOSIS — G89.29 CHRONIC RIGHT-SIDED LOW BACK PAIN WITH RIGHT-SIDED SCIATICA: ICD-10-CM

## 2022-03-22 DIAGNOSIS — E78.01 FAMILIAL HYPERCHOLESTEROLEMIA: ICD-10-CM

## 2022-03-22 DIAGNOSIS — Z00.00 ANNUAL PHYSICAL EXAM: Primary | ICD-10-CM

## 2022-03-22 DIAGNOSIS — R73.03 PREDIABETES: ICD-10-CM

## 2022-03-22 DIAGNOSIS — K21.9 GASTROESOPHAGEAL REFLUX DISEASE, UNSPECIFIED WHETHER ESOPHAGITIS PRESENT: ICD-10-CM

## 2022-03-22 DIAGNOSIS — G44.219 EPISODIC TENSION-TYPE HEADACHE, NOT INTRACTABLE: ICD-10-CM

## 2022-03-22 DIAGNOSIS — I10 PRIMARY HYPERTENSION: ICD-10-CM

## 2022-03-22 DIAGNOSIS — F43.21 GRIEVING: ICD-10-CM

## 2022-03-22 DIAGNOSIS — Z79.899 MEDICAL MARIJUANA USE: ICD-10-CM

## 2022-03-22 DIAGNOSIS — M79.7 FIBROMYALGIA: ICD-10-CM

## 2022-03-22 DIAGNOSIS — M54.41 CHRONIC RIGHT-SIDED LOW BACK PAIN WITH RIGHT-SIDED SCIATICA: ICD-10-CM

## 2022-03-22 DIAGNOSIS — Z87.891 PERSONAL HISTORY OF TOBACCO USE: ICD-10-CM

## 2022-03-22 DIAGNOSIS — Z86.39 HISTORY OF VITAMIN D DEFICIENCY: ICD-10-CM

## 2022-03-22 PROCEDURE — G8484 FLU IMMUNIZE NO ADMIN: HCPCS | Performed by: NURSE PRACTITIONER

## 2022-03-22 PROCEDURE — G0296 VISIT TO DETERM LDCT ELIG: HCPCS | Performed by: NURSE PRACTITIONER

## 2022-03-22 PROCEDURE — 36415 COLL VENOUS BLD VENIPUNCTURE: CPT | Performed by: NURSE PRACTITIONER

## 2022-03-22 PROCEDURE — 99396 PREV VISIT EST AGE 40-64: CPT | Performed by: NURSE PRACTITIONER

## 2022-03-22 SDOH — ECONOMIC STABILITY: FOOD INSECURITY: WITHIN THE PAST 12 MONTHS, YOU WORRIED THAT YOUR FOOD WOULD RUN OUT BEFORE YOU GOT MONEY TO BUY MORE.: NEVER TRUE

## 2022-03-22 SDOH — ECONOMIC STABILITY: TRANSPORTATION INSECURITY
IN THE PAST 12 MONTHS, HAS LACK OF TRANSPORTATION KEPT YOU FROM MEETINGS, WORK, OR FROM GETTING THINGS NEEDED FOR DAILY LIVING?: NO

## 2022-03-22 SDOH — ECONOMIC STABILITY: FOOD INSECURITY: WITHIN THE PAST 12 MONTHS, THE FOOD YOU BOUGHT JUST DIDN'T LAST AND YOU DIDN'T HAVE MONEY TO GET MORE.: NEVER TRUE

## 2022-03-22 SDOH — ECONOMIC STABILITY: TRANSPORTATION INSECURITY
IN THE PAST 12 MONTHS, HAS THE LACK OF TRANSPORTATION KEPT YOU FROM MEDICAL APPOINTMENTS OR FROM GETTING MEDICATIONS?: NO

## 2022-03-22 ASSESSMENT — PATIENT HEALTH QUESTIONNAIRE - PHQ9
8. MOVING OR SPEAKING SO SLOWLY THAT OTHER PEOPLE COULD HAVE NOTICED. OR THE OPPOSITE, BEING SO FIGETY OR RESTLESS THAT YOU HAVE BEEN MOVING AROUND A LOT MORE THAN USUAL: 0
10. IF YOU CHECKED OFF ANY PROBLEMS, HOW DIFFICULT HAVE THESE PROBLEMS MADE IT FOR YOU TO DO YOUR WORK, TAKE CARE OF THINGS AT HOME, OR GET ALONG WITH OTHER PEOPLE: 0
SUM OF ALL RESPONSES TO PHQ QUESTIONS 1-9: 0
5. POOR APPETITE OR OVEREATING: 0
7. TROUBLE CONCENTRATING ON THINGS, SUCH AS READING THE NEWSPAPER OR WATCHING TELEVISION: 0
4. FEELING TIRED OR HAVING LITTLE ENERGY: 0
SUM OF ALL RESPONSES TO PHQ9 QUESTIONS 1 & 2: 0
SUM OF ALL RESPONSES TO PHQ QUESTIONS 1-9: 0
2. FEELING DOWN, DEPRESSED OR HOPELESS: 0
9. THOUGHTS THAT YOU WOULD BE BETTER OFF DEAD, OR OF HURTING YOURSELF: 0
3. TROUBLE FALLING OR STAYING ASLEEP: 0
SUM OF ALL RESPONSES TO PHQ QUESTIONS 1-9: 0
1. LITTLE INTEREST OR PLEASURE IN DOING THINGS: 0
SUM OF ALL RESPONSES TO PHQ QUESTIONS 1-9: 0
6. FEELING BAD ABOUT YOURSELF - OR THAT YOU ARE A FAILURE OR HAVE LET YOURSELF OR YOUR FAMILY DOWN: 0

## 2022-03-22 ASSESSMENT — ENCOUNTER SYMPTOMS
ABDOMINAL PAIN: 0
VOMITING: 0
RHINORRHEA: 0
EYE DISCHARGE: 0
CHEST TIGHTNESS: 0
SHORTNESS OF BREATH: 0
NAUSEA: 1
BACK PAIN: 1
CONSTIPATION: 0
DIARRHEA: 0
COUGH: 0

## 2022-03-22 ASSESSMENT — SOCIAL DETERMINANTS OF HEALTH (SDOH): HOW HARD IS IT FOR YOU TO PAY FOR THE VERY BASICS LIKE FOOD, HOUSING, MEDICAL CARE, AND HEATING?: NOT HARD AT ALL

## 2022-03-22 NOTE — PROGRESS NOTES
David Maldonado [address] [facility]. [facility] [address]  Dept: [phone]  Dept Fax: [phone]    Visit type: Established patient    Reason for Visit: Annual Exam (annual ) and Health Maintenance (ct lung / pap / colorectal screening )         Assessment and Plan       1. Annual physical exam  2. Primary hypertension  3. Familial hypercholesterolemia  4. Episodic tension-type headache, not intractable  5. Fibromyalgia  6. Chronic right-sided low back pain with right-sided sciatica  7. Prediabetes  -     CBC with Auto Differential; Future  -     Hemoglobin A1C; Future  -     Lipid Panel; Future  -     Magnesium; Future  -     TSH with Reflex; Future  -     Comprehensive Metabolic Panel; Future  8. Gastroesophageal reflux disease, unspecified whether esophagitis present  9. History of vitamin D deficiency  -     Vitamin D 25 Hydroxy; Future  10. Medical marijuana use  11. Personal history of tobacco use  -     UT VISIT TO DISCUSS LUNG CA SCREEN W LDCT  -     CT Lung Screen (Annual); Future  12. Grieving    Discussed AHA/ ADA diet and staying active  Is now using medical marijuana for her fibromyalgia  Has cologuard in which she is going to complete this weekend  Is okay with doing pap at follow up apt  Ct of lungs to be completed in summer  Counseling given on grieving process- does have good support system with family   Labs today  Last labs reviewed   HTN controlled - continue lisinopril  gerd controlled- continue nexium, carafate and zofran PRN  Mood controlled- continue celexa      Return in about 6 months (around 9/22/2022) for fu chronic issues, pap . Subjective       Here for annual exam    Working [employer] in caf[employer]ia     Fibromyalgia. Onset years ago  Failed past treatments- motrin and naprosyn  pain management. no   xrays?  Hip   Areas affected- upper back and hip   Doing home exercises    verilife- medical marijuana     Hx of depression  Onset years ago  Currently taking celexa  Grieving mother's death. Has good support system  No SI or HI     Care gaps? Does not want flu shot*  Mammogram- had completed  Dental- had cleaning   Eye- just got new glasses     PAP- due - is okay to do it here at follow up visit   Ct of lung? - Is willing to get completed in summer while off of work    HTN  Onset years ago  Taking ace   No CP or palpitations   No SOB with exertion     Prediabetes   Onset months ago  Diet? Low carb avoid sugar   Exercise? No  Metformin? Felt ill so quit   Ace- yes   Rare monitor BS at home      Is drinking a lot of water. 3-4 bottles per day      gerd  Onset years ago  Taking ppi, and carafate   EGD? No     Has cologuard at home and is going to complete this weekend she states       Review of Systems   Constitutional: Negative for activity change, appetite change and fever. HENT: Negative for congestion, ear pain and rhinorrhea. Eyes: Negative for discharge and visual disturbance. Respiratory: Negative for cough, chest tightness and shortness of breath. Cardiovascular: Negative for chest pain and palpitations. Gastrointestinal: Positive for nausea (chronic ). Negative for abdominal pain, constipation, diarrhea and vomiting. Heartburn    Genitourinary: Negative for difficulty urinating and hematuria. Musculoskeletal: Positive for arthralgias (chronic right hip. ), back pain (chronic) and myalgias. Skin: Negative for rash. Neurological: Negative for dizziness, weakness, numbness and headaches. Psychiatric/Behavioral: Positive for behavioral problems (grieving ). The patient is not nervous/anxious.          Allergies   Allergen Reactions    Latex Dermatitis     From the powder in the gloves    Flexeril [Cyclobenzaprine Hcl] Other (See Comments)     \"feel like bugs crawling all over me\"    Metformin And Related Other (See Comments)     Gi cramps, nausea       Outpatient Medications Prior to Visit   Medication Sig Dispense Refill    lisinopril (PRINIVIL;ZESTRIL) 20 MG tablet take 1 tablet by mouth once daily 90 tablet 0    esomeprazole (NEXIUM) 40 MG delayed release capsule Take 1 capsule by mouth every morning (before breakfast) 30 capsule 3    pravastatin (PRAVACHOL) 80 MG tablet take 1 tablet by mouth once daily 90 tablet 1    citalopram (CELEXA) 40 MG tablet take 1 tablet by mouth once daily 90 tablet 1    ondansetron (ZOFRAN) 4 MG tablet Take 1 tablet by mouth 3 times daily as needed for Nausea 30 tablet 1    sucralfate (CARAFATE) 1 GM tablet take 1 tablet by mouth four times a day 120 tablet 3     No facility-administered medications prior to visit.         Past Medical History:   Diagnosis Date    Back pain     Depression     Fibromyalgia     Headache(784.0)     History of cold sores     Hyperlipidemia     Hypertension     Nausea 2019    Nausea & vomiting     PONV (postoperative nausea and vomiting)     Tobacco dependency         Social History     Tobacco Use    Smoking status: Former Smoker     Packs/day: 1.50     Years: 30.00     Pack years: 45.00     Start date: 2010     Quit date:      Years since quittin.2    Smokeless tobacco: Never Used   Substance Use Topics    Alcohol use: Yes     Comment: Rarely         Past Surgical History:   Procedure Laterality Date    BACK SURGERY      BREAST BIOPSY      right; negative    CERVICAL FUSION  3/00    C5-6     SECTION      DILATION AND CURETTAGE OF UTERUS      DILATION AND CURETTAGE OF UTERUS N/A 2017    DILATATION AND CURETTAGE HYSTEROSCOPY performed by Gurjit Raza DO at [address]  10/11/2013    L 4-5 Decompression and Posterior Fusion    OTHER SURGICAL HISTORY  6/15/2016    EXTRACTIONS TEETH # 3-4-18-31    TUBAL LIGATION  1988       Family History   Problem Relation Age of Onset    Cancer Mother         lymphoma    High Blood Pressure Mother     Heart Disease Mother    [facility] High Blood Pressure Father        Objective       /68   Pulse 51   Temp 96.8 °F (36 °C) (Temporal)   Resp 18   Ht 5' 3\" (1.6 m)   Wt 147 lb (66.7 kg)   LMP 05/09/2015   SpO2 97%   BMI 26.04 kg/m²   Physical Exam  Vitals reviewed. Constitutional:       Appearance: She is well-developed. HENT:      Head: Normocephalic and atraumatic. Right Ear: External ear normal.      Left Ear: External ear normal.   Eyes:      Conjunctiva/sclera: Conjunctivae normal.   Neck:      Thyroid: No thyromegaly. Trachea: Trachea normal.   Cardiovascular:      Rate and Rhythm: Normal rate and regular rhythm. Heart sounds: Normal heart sounds. No murmur heard. No friction rub. No gallop. Pulmonary:      Effort: Pulmonary effort is normal.      Breath sounds: Normal breath sounds. Abdominal:      General: Bowel sounds are normal.      Palpations: Abdomen is soft. Tenderness: There is no abdominal tenderness. Musculoskeletal:         General: Normal range of motion. Cervical back: Normal range of motion and neck supple. No spinous process tenderness. Skin:     General: Skin is warm and dry. Findings: No erythema or rash. Neurological:      Mental Status: She is alert and oriented to person, place, and time. Psychiatric:         Speech: Speech normal.         Behavior: Behavior normal.         Thought Content:  Thought content normal.           Data Reviewed and Summarized       Labs:   Lab Results   Component Value Date    CHOL 178 03/10/2021    CHOL 189 02/10/2020    CHOL 185 10/24/2018     Lab Results   Component Value Date    TRIG 95 03/10/2021    TRIG 84 02/10/2020    TRIG 122 10/24/2018     Lab Results   Component Value Date    HDL 62 03/10/2021    HDL 58 02/10/2020    HDL 57 10/24/2018     Lab Results   Component Value Date    LDLCHOLESTEROL 97 03/10/2021    LDLCHOLESTEROL 114 02/10/2020    LDLCALC 104 10/24/2018    LDLCALC 119 02/08/2016     Lab Results   Component Value Date VLDL NOT REPORTED 03/10/2021    VLDL NOT REPORTED 02/10/2020     Lab Results   Component Value Date    CHOLHDLRATIO 2.9 03/10/2021    CHOLHDLRATIO 3.3 02/10/2020     Lab Results   Component Value Date    WBC 9.2 03/10/2021    HGB 13.1 03/10/2021    HCT 42.6 03/10/2021    MCV 94.9 03/10/2021     03/10/2021    LABLYMP 30.4 10/24/2018    LYMPHOPCT 32 03/10/2021    RBC 4.49 03/10/2021    MCH 29.2 03/10/2021    MCHC 30.8 03/10/2021    RDW 13.1 03/10/2021         Lab Results   Component Value Date     03/10/2021    K 4.6 03/10/2021     03/10/2021    CO2 28 03/10/2021    BUN 19 03/10/2021    CREATININE 0.76 03/10/2021    GLUCOSE 92 03/10/2021    CALCIUM 9.6 03/10/2021    PROT 7.2 03/10/2021    LABALBU 4.3 03/10/2021    BILITOT 0.19 (L) 03/10/2021    ALKPHOS 60 03/10/2021    AST 14 03/10/2021    ALT 9 03/10/2021    LABGLOM >60 03/10/2021    GFRAA >60 03/10/2021       Lab Results   Component Value Date    TSH 1.60 03/10/2021     Lab Results   Component Value Date    LABA1C 5.7 03/10/2021     Lab Results   Component Value Date     03/10/2021       Imaging/Testing:        MARISA RYAN APRN - CNP        Low Dose CT (LDCT) Lung Screening criteria met:     Age 50-77(Medicare) or 50-80 (USPST)   Pack year smoking >20   Still smoking or less than 15 year since quit   No sign or symptoms of lung cancer   > 11 months since last LDCT     Risks and benefits of lung cancer screening with LDCT scans discussed:    Significance of positive screen - False-positive LDCT results often occur. 95% of all positive results do not lead to a diagnosis of cancer. Usually further imaging can resolve most false-positive results; however, some patients may require invasive procedures. Over diagnosis risk - 10% to 12% of screen-detected lung cancer cases are over diagnosed--that is, the cancer would not have been detected in the patient's lifetime without the screening.     Need for follow up screens annually to

## 2022-03-22 NOTE — PATIENT INSTRUCTIONS
(STIs). You can help prevent STIs if you wait to have sex with a new partner (or partners) until you've each been tested for STIs. It also helps if you use condoms (male or female condoms) and if you limit your sex partners to one person who only has sex with you. Vaccines are available for some STIs. · If you think you may have a problem with alcohol or drug use, talk to your doctor. This includes prescription medicines (such as amphetamines and opioids) and illegal drugs (such as cocaine and methamphetamine). Your doctor can help you figure out what type of treatment is best for you. · Protect your skin from too much sun. When you're outdoors from 10 a.m. to 4 p.m., stay in the shade or cover up with clothing and a hat with a wide brim. Wear sunglasses that block UV rays. Even when it's cloudy, put broad-spectrum sunscreen (SPF 30 or higher) on any exposed skin. · See a dentist one or two times a year for checkups and to have your teeth cleaned. · Wear a seat belt in the car. When should you call for help? Watch closely for changes in your health, and be sure to contact your doctor if you have any problems or symptoms that concern you. Where can you learn more? Go to https://VeedMe.healthShowUhowpartners. org and sign in to your videoNEXT account. Enter O481 in the [facility] box to learn more about \"Well Visit, Women 50 to 72: Care Instructions. \"     If you do not have an account, please click on the \"Sign Up Now\" link. Current as of: October 6, 2021               Content Version: 13.1  © 8957-7341 Healthwise, Incorporated. Care instructions adapted under license by [facility] ([facility]). If you have questions about a medical condition or this instruction, always ask your healthcare professional. [place] [address] any warranty or liability for your use of this information. What is lung cancer screening?   Lung cancer screening is a way in which doctors check the lungs for early signs of cancer in people who have no symptoms of lung cancer. A low-dose CT scan uses much less radiation than a normal CT scan and shows a more detailed image of the lungs than a standard X-ray. The goal of lung cancer screening is to find cancer early, before it has a chance to grow, spread, or cause problems. One large study found that smokers who were screened with low-dose CT scans were less likely to die of lung cancer than those who were screened with standard X-ray. Below is a summary of the things you need to know regarding screening for lung cancer with low-dose computed tomography (LDCT). This is a screening program that involves routine annual screening with LDCT studies of the lung. The LDCTs are done using low-dose radiation that is not thought to increase your cancer risk. If you have other serious medical conditions (other cancers, congestive heart failure) that limit your life expectancy to less than 10 years, you should not undergo lung cancer screening with LDCT. The chance is 20%-60% that the LDCT result will show abnormalities. This would require additional testing which could include repeat imaging or even invasive procedures. Most (about 95%) of \"abnormal\" LDCT results are false in the sense that no lung cancer is ultimately found. Additionally, some (about 10%) of the cancers found would not affect your life expectancy, even if undetected and untreated. If you are still smoking, the single most important thing that you can do to reduce your risk of dying of lung cancer is to quit. For this screening to be covered by Medicare and most other insurers, strict criteria must be met. If you do not meet these criteria, but still wish to undergo LDCT testing, you will be required to sign a waiver indicating your willingness to pay for the scan.

## 2022-03-23 DIAGNOSIS — R73.03 PREDIABETES: ICD-10-CM

## 2022-03-23 DIAGNOSIS — E55.9 VITAMIN D INSUFFICIENCY: ICD-10-CM

## 2022-03-23 DIAGNOSIS — Z86.39 HISTORY OF VITAMIN D DEFICIENCY: ICD-10-CM

## 2022-03-23 LAB
ABSOLUTE EOS #: 0.43 K/UL (ref 0–0.44)
ABSOLUTE IMMATURE GRANULOCYTE: <0.03 K/UL (ref 0–0.3)
ABSOLUTE LYMPH #: 3.37 K/UL (ref 1.1–3.7)
ABSOLUTE MONO #: 0.85 K/UL (ref 0.1–1.2)
ALBUMIN SERPL-MCNC: 4.5 G/DL (ref 3.5–5.2)
ALBUMIN/GLOBULIN RATIO: 1.9 (ref 1–2.5)
ALP BLD-CCNC: 52 U/L (ref 35–104)
ALT SERPL-CCNC: 11 U/L (ref 5–33)
ANION GAP SERPL CALCULATED.3IONS-SCNC: 14 MMOL/L (ref 9–17)
AST SERPL-CCNC: 15 U/L
BASOPHILS # BLD: 1 % (ref 0–2)
BASOPHILS ABSOLUTE: 0.1 K/UL (ref 0–0.2)
BILIRUB SERPL-MCNC: <0.1 MG/DL (ref 0.3–1.2)
BUN BLDV-MCNC: 21 MG/DL (ref 8–23)
CALCIUM SERPL-MCNC: 9.5 MG/DL (ref 8.6–10.4)
CHLORIDE BLD-SCNC: 101 MMOL/L (ref 98–107)
CHOLESTEROL/HDL RATIO: 2.6
CHOLESTEROL: 172 MG/DL
CO2: 23 MMOL/L (ref 20–31)
CREAT SERPL-MCNC: 0.76 MG/DL (ref 0.5–0.9)
EOSINOPHILS RELATIVE PERCENT: 5 % (ref 1–4)
ESTIMATED AVERAGE GLUCOSE: 120 MG/DL
GFR AFRICAN AMERICAN: >60 ML/MIN
GFR NON-AFRICAN AMERICAN: >60 ML/MIN
GFR SERPL CREATININE-BSD FRML MDRD: ABNORMAL ML/MIN/{1.73_M2}
GLUCOSE BLD-MCNC: 91 MG/DL (ref 70–99)
HBA1C MFR BLD: 5.8 % (ref 4–6)
HCT VFR BLD CALC: 40.7 % (ref 36.3–47.1)
HDLC SERPL-MCNC: 65 MG/DL
HEMOGLOBIN: 12.9 G/DL (ref 11.9–15.1)
IMMATURE GRANULOCYTES: 0 %
LDL CHOLESTEROL: 90 MG/DL (ref 0–130)
LYMPHOCYTES # BLD: 35 % (ref 24–43)
MAGNESIUM: 2.3 MG/DL (ref 1.6–2.6)
MCH RBC QN AUTO: 30.2 PG (ref 25.2–33.5)
MCHC RBC AUTO-ENTMCNC: 31.7 G/DL (ref 28.4–34.8)
MCV RBC AUTO: 95.3 FL (ref 82.6–102.9)
MONOCYTES # BLD: 9 % (ref 3–12)
NRBC AUTOMATED: 0 PER 100 WBC
PDW BLD-RTO: 13.3 % (ref 11.8–14.4)
PLATELET # BLD: 324 K/UL (ref 138–453)
PMV BLD AUTO: 11.7 FL (ref 8.1–13.5)
POTASSIUM SERPL-SCNC: 4.3 MMOL/L (ref 3.7–5.3)
RBC # BLD: 4.27 M/UL (ref 3.95–5.11)
SEG NEUTROPHILS: 50 % (ref 36–65)
SEGMENTED NEUTROPHILS ABSOLUTE COUNT: 4.75 K/UL (ref 1.5–8.1)
SODIUM BLD-SCNC: 138 MMOL/L (ref 135–144)
TOTAL PROTEIN: 6.9 G/DL (ref 6.4–8.3)
TRIGL SERPL-MCNC: 87 MG/DL
TSH SERPL DL<=0.05 MIU/L-ACNC: 2.58 MIU/L (ref 0.3–5)
VITAMIN D 25-HYDROXY: 50.2 NG/ML
WBC # BLD: 9.5 K/UL (ref 3.5–11.3)

## 2022-05-02 DIAGNOSIS — K29.00 OTHER ACUTE GASTRITIS, PRESENCE OF BLEEDING UNSPECIFIED: ICD-10-CM

## 2022-05-02 DIAGNOSIS — Z12.11 SCREENING FOR COLON CANCER: Primary | ICD-10-CM

## 2022-05-02 DIAGNOSIS — K21.9 GASTROESOPHAGEAL REFLUX DISEASE, UNSPECIFIED WHETHER ESOPHAGITIS PRESENT: ICD-10-CM

## 2022-05-02 RX ORDER — SUCRALFATE 1 G/1
TABLET ORAL
Qty: 120 TABLET | Refills: 3 | Status: SHIPPED | OUTPATIENT
Start: 2022-05-02

## 2022-05-02 NOTE — TELEPHONE ENCOUNTER
PT stopped in and stated that she needs a new cologuard order and that the cologuard order she had originally had was  when she sent it back.

## 2022-05-26 DIAGNOSIS — K21.9 GASTROESOPHAGEAL REFLUX DISEASE, UNSPECIFIED WHETHER ESOPHAGITIS PRESENT: ICD-10-CM

## 2022-05-26 DIAGNOSIS — E78.00 PURE HYPERCHOLESTEROLEMIA: ICD-10-CM

## 2022-05-26 RX ORDER — PRAVASTATIN SODIUM 80 MG/1
TABLET ORAL
Qty: 90 TABLET | Refills: 1 | Status: SHIPPED | OUTPATIENT
Start: 2022-05-26

## 2022-05-26 RX ORDER — ESOMEPRAZOLE MAGNESIUM 40 MG/1
CAPSULE, DELAYED RELEASE ORAL
Qty: 30 CAPSULE | Refills: 3 | Status: SHIPPED | OUTPATIENT
Start: 2022-05-26 | End: 2022-10-05 | Stop reason: ALTCHOICE

## 2022-05-27 LAB — NONINV COLON CA DNA+OCC BLD SCRN STL QL: NEGATIVE

## 2022-06-15 DIAGNOSIS — I10 ESSENTIAL HYPERTENSION: ICD-10-CM

## 2022-06-15 NOTE — TELEPHONE ENCOUNTER
Patient calling in stating the Nexium 40mg is not helping with the acid reflux but she has been taking some of her husbands Protonix for a couple days, states it working great. Patient wants to know if she can get Protonix called in to [address] Patient also states she does not have any refills of Lisinopril 20mg and has been without it for a couple days.   Last Appt. 3/22/2022   Next Appt. 9/22/2022   RX Pended

## 2022-06-17 RX ORDER — LISINOPRIL 20 MG/1
TABLET ORAL
Qty: 90 TABLET | Refills: 0 | Status: SHIPPED | OUTPATIENT
Start: 2022-06-17 | End: 2022-09-20 | Stop reason: SDUPTHER

## 2022-09-20 DIAGNOSIS — I10 ESSENTIAL HYPERTENSION: ICD-10-CM

## 2022-09-20 RX ORDER — LISINOPRIL 20 MG/1
TABLET ORAL
Qty: 90 TABLET | Refills: 0 | Status: SHIPPED | OUTPATIENT
Start: 2022-09-20

## 2022-10-05 ENCOUNTER — OFFICE VISIT (OUTPATIENT)
Dept: FAMILY MEDICINE CLINIC | Age: 62
End: 2022-10-05
Payer: COMMERCIAL

## 2022-10-05 VITALS
RESPIRATION RATE: 16 BRPM | TEMPERATURE: 98.4 F | OXYGEN SATURATION: 98 % | WEIGHT: 148.8 LBS | BODY MASS INDEX: 26.36 KG/M2 | SYSTOLIC BLOOD PRESSURE: 116 MMHG | HEART RATE: 59 BPM | DIASTOLIC BLOOD PRESSURE: 62 MMHG

## 2022-10-05 DIAGNOSIS — N63.20 PAINFUL LUMPY LEFT BREAST: Primary | ICD-10-CM

## 2022-10-05 DIAGNOSIS — K21.9 GASTROESOPHAGEAL REFLUX DISEASE, UNSPECIFIED WHETHER ESOPHAGITIS PRESENT: ICD-10-CM

## 2022-10-05 DIAGNOSIS — N64.4 PAINFUL LUMPY LEFT BREAST: Primary | ICD-10-CM

## 2022-10-05 PROCEDURE — 99214 OFFICE O/P EST MOD 30 MIN: CPT | Performed by: NURSE PRACTITIONER

## 2022-10-05 PROCEDURE — G8427 DOCREV CUR MEDS BY ELIG CLIN: HCPCS | Performed by: NURSE PRACTITIONER

## 2022-10-05 PROCEDURE — 1036F TOBACCO NON-USER: CPT | Performed by: NURSE PRACTITIONER

## 2022-10-05 PROCEDURE — G8484 FLU IMMUNIZE NO ADMIN: HCPCS | Performed by: NURSE PRACTITIONER

## 2022-10-05 PROCEDURE — G8419 CALC BMI OUT NRM PARAM NOF/U: HCPCS | Performed by: NURSE PRACTITIONER

## 2022-10-05 PROCEDURE — 3017F COLORECTAL CA SCREEN DOC REV: CPT | Performed by: NURSE PRACTITIONER

## 2022-10-05 RX ORDER — PANTOPRAZOLE SODIUM 40 MG/1
40 TABLET, DELAYED RELEASE ORAL
Qty: 30 TABLET | Refills: 3 | Status: SHIPPED | OUTPATIENT
Start: 2022-10-05

## 2022-10-05 ASSESSMENT — PATIENT HEALTH QUESTIONNAIRE - PHQ9
3. TROUBLE FALLING OR STAYING ASLEEP: 0
1. LITTLE INTEREST OR PLEASURE IN DOING THINGS: 0
SUM OF ALL RESPONSES TO PHQ QUESTIONS 1-9: 0
SUM OF ALL RESPONSES TO PHQ QUESTIONS 1-9: 0
4. FEELING TIRED OR HAVING LITTLE ENERGY: 0
10. IF YOU CHECKED OFF ANY PROBLEMS, HOW DIFFICULT HAVE THESE PROBLEMS MADE IT FOR YOU TO DO YOUR WORK, TAKE CARE OF THINGS AT HOME, OR GET ALONG WITH OTHER PEOPLE: 0
8. MOVING OR SPEAKING SO SLOWLY THAT OTHER PEOPLE COULD HAVE NOTICED. OR THE OPPOSITE, BEING SO FIGETY OR RESTLESS THAT YOU HAVE BEEN MOVING AROUND A LOT MORE THAN USUAL: 0
9. THOUGHTS THAT YOU WOULD BE BETTER OFF DEAD, OR OF HURTING YOURSELF: 0
6. FEELING BAD ABOUT YOURSELF - OR THAT YOU ARE A FAILURE OR HAVE LET YOURSELF OR YOUR FAMILY DOWN: 0
SUM OF ALL RESPONSES TO PHQ QUESTIONS 1-9: 0
SUM OF ALL RESPONSES TO PHQ QUESTIONS 1-9: 0
7. TROUBLE CONCENTRATING ON THINGS, SUCH AS READING THE NEWSPAPER OR WATCHING TELEVISION: 0
5. POOR APPETITE OR OVEREATING: 0
2. FEELING DOWN, DEPRESSED OR HOPELESS: 0
SUM OF ALL RESPONSES TO PHQ9 QUESTIONS 1 & 2: 0

## 2022-10-05 NOTE — PROGRESS NOTES
Author Paulette [address] [facility] [place]  [address]. [place] [address]  Dept: [phone]  Dept Fax: [phone]    Visit type: Established patient    Reason for Visit: Mass (On rib cage- under left breast x month or so. Can be painful at times) and Gastroesophageal Reflux (Would like to discuss starting Protonix )         Assessment and Plan       1. Painful lumpy left breast  -     LISA DIGITAL DIAGNOSTIC W OR WO CAD BILATERAL; Future  -     US BREAST COMPLETE LEFT; Future  2. Gastroesophageal reflux disease, unspecified whether esophagitis present  -     pantoprazole (PROTONIX) 40 MG tablet; Take 1 tablet by mouth daily (with breakfast), Disp-30 tablet, R-3Normal      Was pointing to her left rib cage is where she notice the lump howver I am noticiing it at 5 oclock on breast   Can schedule for pap and fu visit via [address] or [facility] office  Start protonix, as she has taken her husbands script and is doing well with this   No follow-ups on file. Subjective       Left breast pain/ lump  Onset September  Daughter was diagnosed with breast cancer this year  Weight loss? Last mammogram 8/2/2021    Prilosec did not work  Nexium too expensive   Will send in protonix as this worked for her when she took her husbands        Review of Systems   Constitutional:  Negative for activity change, appetite change and unexpected weight change. Musculoskeletal:  Positive for myalgias (left breast pain and lump).       Allergies   Allergen Reactions    Latex Dermatitis     From the powder in the gloves    Flexeril [Cyclobenzaprine Hcl] Other (See Comments)     \"feel like bugs crawling all over me\"    Metformin And Related Other (See Comments)     Gi cramps, nausea       Outpatient Medications Prior to Visit   Medication Sig Dispense Refill    lisinopril (PRINIVIL;ZESTRIL) 20 MG tablet take 1 tablet by mouth once daily 90 tablet 0    pravastatin (PRAVACHOL) 80 MG tablet take 1 tablet by mouth once daily 90 tablet 1    sucralfate (CARAFATE) 1 GM tablet take 1 tablet by mouth four times a day 120 tablet 3    citalopram (CELEXA) 40 MG tablet take 1 tablet by mouth once daily 90 tablet 1    ondansetron (ZOFRAN) 4 MG tablet Take 1 tablet by mouth 3 times daily as needed for Nausea 30 tablet 1    esomeprazole (NEXIUM) 40 MG delayed release capsule take 1 capsule by mouth EVERY MORNING BEFORE BREAKFAST (Patient not taking: Reported on 10/5/2022) 30 capsule 3     No facility-administered medications prior to visit.         Past Medical History:   Diagnosis Date    Back pain 2016    Depression     Fibromyalgia     Headache(784.0)     History of cold sores     Hyperlipidemia     Hypertension     Nausea 2019    Nausea & vomiting     PONV (postoperative nausea and vomiting)     Tobacco dependency         Social History     Tobacco Use    Smoking status: Former     Packs/day: 1.50     Years: 30.00     Pack years: 45.00     Types: Cigarettes     Start date: 2010     Quit date:      Years since quittin.7    Smokeless tobacco: Never   Substance Use Topics    Alcohol use: Yes     Comment: Rarely         Past Surgical History:   Procedure Laterality Date    BACK SURGERY      BREAST BIOPSY      right; negative    CERVICAL FUSION  3/00    C5-6     SECTION  1986    DILATION AND CURETTAGE OF UTERUS  1983    DILATION AND CURETTAGE OF UTERUS N/A 2017    DILATATION AND CURETTAGE HYSTEROSCOPY performed by Meliton  at [address]  10/11/2013    L 4-5 Decompression and Posterior Fusion    OTHER SURGICAL HISTORY  6/15/2016    EXTRACTIONS TEETH # 3-4-18-31    TUBAL LIGATION  1988       Family History   Problem Relation Age of Onset    Cancer Mother         lymphoma    High Blood Pressure Mother     Heart Disease Mother     High Blood Pressure Father        Objective       /62 (Site: Left Upper Arm, Position: Sitting, Cuff Size: Medium Adult)   Pulse 59   Temp 98.4 °F (36.9 °C) (Temporal)   Resp 16   Wt 148 lb 12.8 oz (67.5 kg)   LMP 2015   SpO2 98%   BMI 26.36 kg/m²   Physical Exam  Constitutional:       General: She is not in acute distress. Appearance: She is well-developed. Interventions: She is not intubated. HENT:      Head: Normocephalic and atraumatic. Right Ear: External ear normal.      Left Ear: External ear normal.   Eyes:      General: Lids are normal.      Conjunctiva/sclera: Conjunctivae normal.   Pulmonary:      Effort: No tachypnea, bradypnea, prolonged expiration, respiratory distress or retractions. She is not intubated. Musculoskeletal:      Cervical back: Normal range of motion. Skin:     Coloration: Skin is not pale. Findings: No erythema or rash. Neurological:      Mental Status: She is alert and oriented to person, place, and time. Psychiatric:         Attention and Perception: Attention and perception normal.         Mood and Affect: Mood and affect normal.         Speech: Speech normal.         Behavior: Behavior normal. Behavior is cooperative. Thought Content: Thought content normal.         Cognition and Memory: Cognition and memory normal.         Judgment: Judgment normal.     Breasts: breasts appear normal, no suspicious masses, no skin or nipple changes or axillary nodes,  except for abnormal mass palpable 5 oclock left breast .      Data Reviewed and Summarized       Labs:     Imaging/Testing:    Narrative   Ordering Provider: Self Referral   Erika Lora          Radiology Department  Patient:  Daniel Lazar 83. :  1960   Sex:   [address] [facility], [address] [facility]  [place]  [address] [place] [address]   Unit #:   [id number]       [facility]       Ordering Phys: Referral Self             Exam Date: 21     Accession #:  [id number]     Exam:  [place]   LISA Mammo Screen with Bassem     Result: See Report            MAMMOGRAPHY - BILATERAL SCREENING - CAD and BASSEM IMAGES          REASON FOR EXAM:    Female, 62years old. Routine annual screening     examination. PERTINENT HISTORY:  Non-contributory. TECHNIQUE:  Digital examination. Mediolateral oblique (MLO) and     craniocaudad (CC) views of both breasts were obtained. CAD: CAD was     performed on this study. Bassem images were reviewed. COMPARISON:   Mammogram dated 07/12/2017, 6/29/2015 and 5/9/2014          FINDINGS:     Breast Composition:  There are scattered areas of fibroglandular density. There are no suspicious masses, suspicious cluster of microcalcifications,     architectural distortion or secondary sign of malignancy identified in     either breast.  Focal fibroglandular densities are noted bilaterally. Benign round calcifications are seen in both breasts. No other significant abnormalities are identified. CAD and Bassem were reviewed. IMPRESSION:     Stable bilateral screening mammogram.          ASSESSMENT CATEGORY:     BIRADS Category 2:  Benign. A letter regarding these results will be sent     to the patient by the facility within 30 days. FOLLOW UP RECOMMENDATION:     Yearly follow up mammogram recommended. (A)          Approximately 10% of breast cancers are not detected by mammography. A     normal mammogram should not delay biopsy of a clinically suspicious     abnormality. [id number]          Electronically Signed:     Sierra Bermudez DO     2021/08/03 at 5:31 EDT     Tel [phone], Service support  [phone], Fax [phone]                         cc: Mary Smith CNP;  Self Referral               Dictated by:  Trini Mckeon DO on 08/03/21 0531     Technologist:   RT Leticia Wilson (R M)     Transcribed by:  Trini Mckeon on 08/03/21 0531          Report Signed by:  Katelyn PRICE on 08/03/21 0531       KELSIE Ng CNP

## 2022-10-12 ENCOUNTER — HOSPITAL ENCOUNTER (OUTPATIENT)
Dept: WOMENS IMAGING | Age: 62
Discharge: HOME | End: 2022-10-12
Payer: COMMERCIAL

## 2022-10-12 ENCOUNTER — HOSPITAL ENCOUNTER (OUTPATIENT)
Dept: WOMENS IMAGING | Age: 62
Discharge: HOME | End: 2022-10-12

## 2022-10-12 DIAGNOSIS — Z12.31 VISIT FOR SCREENING MAMMOGRAM: ICD-10-CM

## 2022-10-12 DIAGNOSIS — N63.20 PAINFUL LUMPY LEFT BREAST: ICD-10-CM

## 2022-10-12 DIAGNOSIS — N64.4 PAINFUL LUMPY LEFT BREAST: ICD-10-CM

## 2022-10-12 DIAGNOSIS — F34.1 DYSTHYMIA: ICD-10-CM

## 2022-10-12 PROCEDURE — 76642 ULTRASOUND BREAST LIMITED: CPT

## 2022-10-12 PROCEDURE — G0279 TOMOSYNTHESIS, MAMMO: HCPCS

## 2022-10-12 RX ORDER — CITALOPRAM 40 MG/1
TABLET ORAL
Qty: 90 TABLET | Refills: 1 | Status: SHIPPED | OUTPATIENT
Start: 2022-10-12

## 2022-12-13 ENCOUNTER — OFFICE VISIT (OUTPATIENT)
Dept: FAMILY MEDICINE CLINIC | Age: 62
End: 2022-12-13
Payer: COMMERCIAL

## 2022-12-13 ENCOUNTER — ANCILLARY PROCEDURE (OUTPATIENT)
Dept: FAMILY MEDICINE CLINIC | Age: 62
End: 2022-12-13
Payer: COMMERCIAL

## 2022-12-13 VITALS
TEMPERATURE: 96.8 F | RESPIRATION RATE: 16 BRPM | SYSTOLIC BLOOD PRESSURE: 110 MMHG | BODY MASS INDEX: 26.93 KG/M2 | WEIGHT: 152 LBS | OXYGEN SATURATION: 98 % | HEART RATE: 55 BPM | DIASTOLIC BLOOD PRESSURE: 82 MMHG

## 2022-12-13 DIAGNOSIS — M25.551 RIGHT HIP PAIN: Primary | ICD-10-CM

## 2022-12-13 DIAGNOSIS — M25.551 RIGHT HIP PAIN: ICD-10-CM

## 2022-12-13 DIAGNOSIS — M76.891 HIP FLEXOR TENDONITIS, RIGHT: ICD-10-CM

## 2022-12-13 PROCEDURE — 3017F COLORECTAL CA SCREEN DOC REV: CPT | Performed by: NURSE PRACTITIONER

## 2022-12-13 PROCEDURE — 73502 X-RAY EXAM HIP UNI 2-3 VIEWS: CPT | Performed by: NURSE PRACTITIONER

## 2022-12-13 PROCEDURE — G8419 CALC BMI OUT NRM PARAM NOF/U: HCPCS | Performed by: NURSE PRACTITIONER

## 2022-12-13 PROCEDURE — 99213 OFFICE O/P EST LOW 20 MIN: CPT | Performed by: NURSE PRACTITIONER

## 2022-12-13 PROCEDURE — G8484 FLU IMMUNIZE NO ADMIN: HCPCS | Performed by: NURSE PRACTITIONER

## 2022-12-13 PROCEDURE — 3078F DIAST BP <80 MM HG: CPT | Performed by: NURSE PRACTITIONER

## 2022-12-13 PROCEDURE — G8427 DOCREV CUR MEDS BY ELIG CLIN: HCPCS | Performed by: NURSE PRACTITIONER

## 2022-12-13 PROCEDURE — 73502 X-RAY EXAM HIP UNI 2-3 VIEWS: CPT

## 2022-12-13 PROCEDURE — 1036F TOBACCO NON-USER: CPT | Performed by: NURSE PRACTITIONER

## 2022-12-13 PROCEDURE — 3074F SYST BP LT 130 MM HG: CPT | Performed by: NURSE PRACTITIONER

## 2022-12-13 RX ORDER — NAPROXEN 500 MG/1
500 TABLET ORAL 2 TIMES DAILY WITH MEALS
Qty: 20 TABLET | Refills: 0 | Status: SHIPPED | OUTPATIENT
Start: 2022-12-13 | End: 2022-12-23

## 2022-12-13 ASSESSMENT — PATIENT HEALTH QUESTIONNAIRE - PHQ9
5. POOR APPETITE OR OVEREATING: 0
SUM OF ALL RESPONSES TO PHQ QUESTIONS 1-9: 0
SUM OF ALL RESPONSES TO PHQ9 QUESTIONS 1 & 2: 0
9. THOUGHTS THAT YOU WOULD BE BETTER OFF DEAD, OR OF HURTING YOURSELF: 0
1. LITTLE INTEREST OR PLEASURE IN DOING THINGS: 0
4. FEELING TIRED OR HAVING LITTLE ENERGY: 0
SUM OF ALL RESPONSES TO PHQ QUESTIONS 1-9: 0
10. IF YOU CHECKED OFF ANY PROBLEMS, HOW DIFFICULT HAVE THESE PROBLEMS MADE IT FOR YOU TO DO YOUR WORK, TAKE CARE OF THINGS AT HOME, OR GET ALONG WITH OTHER PEOPLE: 0
3. TROUBLE FALLING OR STAYING ASLEEP: 0
2. FEELING DOWN, DEPRESSED OR HOPELESS: 0
6. FEELING BAD ABOUT YOURSELF - OR THAT YOU ARE A FAILURE OR HAVE LET YOURSELF OR YOUR FAMILY DOWN: 0
SUM OF ALL RESPONSES TO PHQ QUESTIONS 1-9: 0
SUM OF ALL RESPONSES TO PHQ QUESTIONS 1-9: 0
8. MOVING OR SPEAKING SO SLOWLY THAT OTHER PEOPLE COULD HAVE NOTICED. OR THE OPPOSITE, BEING SO FIGETY OR RESTLESS THAT YOU HAVE BEEN MOVING AROUND A LOT MORE THAN USUAL: 0
7. TROUBLE CONCENTRATING ON THINGS, SUCH AS READING THE NEWSPAPER OR WATCHING TELEVISION: 0

## 2022-12-13 NOTE — LETTER
[address], [place]  [facility]. [place] [address]  Phone: [phone]  Fax: [phone]    KELSIE Simmons CNP        December 13, 2022     Patient: Mohamud Villasenor   YOB: 1960   Date of Visit: 12/13/2022       To Whom it May Concern:    Jud Lopez was seen in my clinic on 12/13/2022. She may return to work on 12/20/2022. If you have any questions or concerns, please don't hesitate to call.     Sincerely,         KELSIE Simmons CNP

## 2022-12-13 NOTE — PROGRESS NOTES
Jestine Courts [address] [facility]. [facility] [address]  Dept: [phone]  Dept Fax: [phone]    Visit type: Established patient    Reason for Visit: Joint Pain (Right Hip- No injury known)         Assessment and Plan       1. Right hip pain  -     naproxen (NAPROSYN) 500 MG tablet; Take 1 tablet by mouth 2 times daily (with meals) for 10 days, Disp-20 tablet, R-0Normal  -     XR HIP 2-3 VW W PELVIS RIGHT; Future  2. Hip flexor tendonitis, right  -     naproxen (NAPROSYN) 500 MG tablet; Take 1 tablet by mouth 2 times daily (with meals) for 10 days, Disp-20 tablet, R-0Normal  -     XR HIP 2-3 VW W PELVIS RIGHT; Future    NSAIDS, rotate ice and moist heat, last xray reviewed, xray will be repeated today  Given home exercises to completed- can consider PT In the future   Declines steroid or muscle relaxer   Return if symptoms worsen or fail to improve. Subjective       Right hip pain  Onset two weeks ago after cleaning her father's house on a Saturday  Did see chiropractor multiple times- will feel better for a little while and then hurts worse  Walking, moving all makes it worse   Had xray completed 3 years ago            Review of Systems   Musculoskeletal:  Positive for arthralgias, gait problem and myalgias. Neurological:  Negative for weakness and numbness.       Allergies   Allergen Reactions    Latex Dermatitis     From the powder in the gloves    Flexeril [Cyclobenzaprine Hcl] Other (See Comments)     \"feel like bugs crawling all over me\"    Metformin And Related Other (See Comments)     Gi cramps, nausea       Outpatient Medications Prior to Visit   Medication Sig Dispense Refill    citalopram (CELEXA) 40 MG tablet take 1 tablet by mouth once daily 90 tablet 1    pantoprazole (PROTONIX) 40 MG tablet Take 1 tablet by mouth daily (with breakfast) 30 tablet 3    lisinopril (PRINIVIL;ZESTRIL) 20 MG tablet take 1 tablet by mouth once daily 90 tablet 0    pravastatin (PRAVACHOL) 80 MG tablet take 1 tablet by mouth once daily 90 tablet 1    sucralfate (CARAFATE) 1 GM tablet take 1 tablet by mouth four times a day 120 tablet 3    ondansetron (ZOFRAN) 4 MG tablet Take 1 tablet by mouth 3 times daily as needed for Nausea 30 tablet 1     No facility-administered medications prior to visit.         Past Medical History:   Diagnosis Date    Back pain     Depression     Fibromyalgia     Headache(784.0)     History of cold sores     Hyperlipidemia     Hypertension     Nausea 2019    Nausea & vomiting     PONV (postoperative nausea and vomiting)     Tobacco dependency         Social History     Tobacco Use    Smoking status: Former     Packs/day: 1.50     Years: 36.00     Pack years: 54.00     Types: Cigarettes     Start date: 2010     Quit date:      Years since quittin.    Smokeless tobacco: Never   Substance Use Topics    Alcohol use: Yes     Comment: Rarely         Past Surgical History:   Procedure Laterality Date    BACK SURGERY      BREAST BIOPSY      right; negative    CERVICAL FUSION  3/00    C5-6     SECTION      DILATION AND CURETTAGE OF UTERUS      DILATION AND CURETTAGE OF UTERUS N/A 2017    DILATATION AND CURETTAGE HYSTEROSCOPY performed by Real Larry DO at [address] [place] [address]  10/11/2013    L 4-5 Decompression and Posterior Fusion    OTHER SURGICAL HISTORY  6/15/2016    EXTRACTIONS TEETH # [id number]    TUBAL LIGATION  1988       Family History   Problem Relation Age of Onset    Cancer Mother         lymphoma    High Blood Pressure Mother     Heart Disease Mother     High Blood Pressure Father        Objective       /82 (Site: Left Upper Arm, Position: Standing, Cuff Size: Medium Adult)   Pulse 55   Temp 96.8 °F (36 °C) (Temporal)   Resp 16   Wt 152 lb (68.9 kg)   LMP 2015   SpO2 98%   BMI 26.93 kg/m²   Physical Exam  Constitutional:       General: She is not in acute distress. Appearance: She is well-developed. Interventions: She is not intubated. HENT:      Head: Normocephalic and atraumatic. Right Ear: External ear normal.      Left Ear: External ear normal.   Eyes:      General: Lids are normal.      Conjunctiva/sclera: Conjunctivae normal.   Pulmonary:      Effort: No tachypnea, bradypnea, prolonged expiration, respiratory distress or retractions. She is not intubated. Musculoskeletal:      Cervical back: Normal range of motion. Right hip: Tenderness present. No deformity, lacerations, bony tenderness or crepitus. Normal range of motion. Normal strength. Skin:     Coloration: Skin is not pale. Findings: No erythema or rash. Neurological:      Mental Status: She is alert and oriented to person, place, and time. Psychiatric:         Attention and Perception: Attention and perception normal.         Mood and Affect: Mood and affect normal.         Speech: Speech normal.         Behavior: Behavior normal. Behavior is cooperative. Thought Content: Thought content normal.         Cognition and Memory: Cognition and memory normal.         Judgment: Judgment normal.       Data Reviewed and Summarized       Labs:     Imaging/Testing:    Narrative   PROCEDURE: XR HIP 2-3 VW W PELVIS RIGHT       CLINICAL INFORMATION: Chronic right hip pain, Chronic right hip pain       COMPARISON: No prior study. TECHNIQUE: A single AP view of the pelvis was obtained in addition to AP and frog-leg views of the right hip. FINDINGS: The hip joints, sacroiliac joints, and bones of the pelvis are intact. Normal abduction of right hip is demonstrated. Impression   Normal pelvis and right hip. **This report has been created using voice recognition software. It may contain minor errors which are inherent in voice recognition technology. **       Final report electronically signed by Dr. Sonali Sullivan on 9/11/2019 2:30 PM       KELSIE Rabago - CNP

## 2022-12-29 DIAGNOSIS — E78.00 PURE HYPERCHOLESTEROLEMIA: ICD-10-CM

## 2022-12-30 RX ORDER — PRAVASTATIN SODIUM 80 MG/1
80 TABLET ORAL DAILY
Qty: 90 TABLET | Refills: 1 | Status: SHIPPED | OUTPATIENT
Start: 2022-12-30

## 2023-01-03 DIAGNOSIS — E78.00 PURE HYPERCHOLESTEROLEMIA: ICD-10-CM

## 2023-01-03 DIAGNOSIS — I10 ESSENTIAL HYPERTENSION: ICD-10-CM

## 2023-01-03 RX ORDER — PRAVASTATIN SODIUM 80 MG/1
80 TABLET ORAL DAILY
Qty: 90 TABLET | Refills: 1 | Status: SHIPPED | OUTPATIENT
Start: 2023-01-03

## 2023-01-03 RX ORDER — LISINOPRIL 20 MG/1
TABLET ORAL
Qty: 90 TABLET | Refills: 0 | Status: SHIPPED | OUTPATIENT
Start: 2023-01-03

## 2023-01-05 ENCOUNTER — TELEPHONE (OUTPATIENT)
Dept: FAMILY MEDICINE CLINIC | Age: 63
End: 2023-01-05

## 2023-01-05 DIAGNOSIS — K21.9 GASTROESOPHAGEAL REFLUX DISEASE, UNSPECIFIED WHETHER ESOPHAGITIS PRESENT: Primary | ICD-10-CM

## 2023-01-05 RX ORDER — SUCRALFATE ORAL 1 G/10ML
1 SUSPENSION ORAL 4 TIMES DAILY
Qty: 1200 ML | Refills: 3 | Status: SHIPPED | OUTPATIENT
Start: 2023-01-05

## 2023-01-05 NOTE — TELEPHONE ENCOUNTER
called, okay per sarath, stating pharmacy informed them they are not able to fill medication as pharmacy only has the liquid version. Requesting new order to be sent in.

## 2023-02-02 DIAGNOSIS — K21.9 GASTROESOPHAGEAL REFLUX DISEASE, UNSPECIFIED WHETHER ESOPHAGITIS PRESENT: ICD-10-CM

## 2023-02-02 RX ORDER — PANTOPRAZOLE SODIUM 40 MG/1
40 TABLET, DELAYED RELEASE ORAL
Qty: 30 TABLET | Refills: 3 | Status: SHIPPED | OUTPATIENT
Start: 2023-02-02

## 2023-03-29 DIAGNOSIS — I10 ESSENTIAL HYPERTENSION: ICD-10-CM

## 2023-03-29 RX ORDER — LISINOPRIL 20 MG/1
TABLET ORAL
Qty: 90 TABLET | Refills: 0 | Status: SHIPPED | OUTPATIENT
Start: 2023-03-29

## 2023-04-14 DIAGNOSIS — F34.1 DYSTHYMIA: ICD-10-CM

## 2023-04-17 RX ORDER — CITALOPRAM 40 MG/1
40 TABLET ORAL DAILY
Qty: 90 TABLET | Refills: 1 | Status: SHIPPED | OUTPATIENT
Start: 2023-04-17 | End: 2023-04-19 | Stop reason: SDUPTHER

## 2023-04-19 DIAGNOSIS — F34.1 DYSTHYMIA: ICD-10-CM

## 2023-04-19 RX ORDER — CITALOPRAM 40 MG/1
40 TABLET ORAL DAILY
Qty: 90 TABLET | Refills: 1 | Status: SHIPPED | OUTPATIENT
Start: 2023-04-19

## 2023-07-03 DIAGNOSIS — I10 ESSENTIAL HYPERTENSION: ICD-10-CM

## 2023-07-03 RX ORDER — LISINOPRIL 20 MG/1
TABLET ORAL
Qty: 90 TABLET | Refills: 0 | Status: SHIPPED | OUTPATIENT
Start: 2023-07-03

## 2023-07-03 NOTE — TELEPHONE ENCOUNTER
[facility] faxing over refill request for Lisinopril 20mg  Last Appt. 12/13/2022   Next Appt.  Visit date not found   RX Pended

## 2023-07-25 DIAGNOSIS — Z86.39 HISTORY OF VITAMIN D DEFICIENCY: ICD-10-CM

## 2023-07-25 DIAGNOSIS — R73.03 PREDIABETES: ICD-10-CM

## 2023-07-25 DIAGNOSIS — E78.00 PURE HYPERCHOLESTEROLEMIA: ICD-10-CM

## 2023-07-25 DIAGNOSIS — I10 ESSENTIAL HYPERTENSION: Primary | ICD-10-CM

## 2023-07-25 RX ORDER — PRAVASTATIN SODIUM 80 MG/1
80 TABLET ORAL DAILY
Qty: 30 TABLET | Refills: 0 | Status: SHIPPED | OUTPATIENT
Start: 2023-07-25

## 2023-07-25 NOTE — TELEPHONE ENCOUNTER
30 day script sent in for her cholesterol medication  I would like her to get fasting labs completed and schedule a well visit before more medication can be sent in  Thanks!

## 2023-07-26 NOTE — TELEPHONE ENCOUNTER
I spoke to the patient and notified her of Rx refill and of Gerhard Seip CNP response. Patient is scheduled for labs and appointment with Gerhard Seip CNP.

## 2023-08-04 ENCOUNTER — HOSPITAL ENCOUNTER (OUTPATIENT)
Age: 63
Setting detail: SPECIMEN
Discharge: HOME | End: 2023-08-04

## 2023-08-04 ENCOUNTER — NURSE ONLY (OUTPATIENT)
Dept: FAMILY MEDICINE CLINIC | Age: 63
End: 2023-08-04
Payer: COMMERCIAL

## 2023-08-04 DIAGNOSIS — I10 ESSENTIAL HYPERTENSION: Primary | ICD-10-CM

## 2023-08-04 DIAGNOSIS — Z86.39 HISTORY OF VITAMIN D DEFICIENCY: ICD-10-CM

## 2023-08-04 DIAGNOSIS — I10 ESSENTIAL HYPERTENSION: ICD-10-CM

## 2023-08-04 DIAGNOSIS — R73.03 PREDIABETES: ICD-10-CM

## 2023-08-04 LAB
25(OH)D3 SERPL-MCNC: 60.7 NG/ML
ALBUMIN SERPL-MCNC: 4.7 G/DL (ref 3.5–5.2)
ALBUMIN/GLOB SERPL: 1.6 {RATIO} (ref 1–2.5)
ALP SERPL-CCNC: 61 U/L (ref 35–104)
ALT SERPL-CCNC: 15 U/L (ref 5–33)
ANION GAP SERPL CALCULATED.3IONS-SCNC: 12 MMOL/L (ref 9–17)
AST SERPL-CCNC: 19 U/L
BASOPHILS # BLD: 0.08 K/UL (ref 0–0.2)
BASOPHILS NFR BLD: 1 % (ref 0–2)
BILIRUB SERPL-MCNC: 0.4 MG/DL (ref 0.3–1.2)
BUN SERPL-MCNC: 15 MG/DL (ref 8–23)
CALCIUM SERPL-MCNC: 9.5 MG/DL (ref 8.6–10.4)
CHLORIDE SERPL-SCNC: 103 MMOL/L (ref 98–107)
CHOLEST SERPL-MCNC: 285 MG/DL
CHOLESTEROL/HDL RATIO: 4.5
CO2 SERPL-SCNC: 25 MMOL/L (ref 20–31)
CREAT SERPL-MCNC: 0.8 MG/DL (ref 0.5–0.9)
EOSINOPHIL # BLD: 0.25 K/UL (ref 0–0.44)
EOSINOPHILS RELATIVE PERCENT: 4 % (ref 1–4)
ERYTHROCYTE [DISTWIDTH] IN BLOOD BY AUTOMATED COUNT: 13 % (ref 11.8–14.4)
GFR SERPL CREATININE-BSD FRML MDRD: >60 ML/MIN/1.73M2
GLUCOSE P FAST SERPL-MCNC: 102 MG/DL (ref 70–99)
HCT VFR BLD AUTO: 45.8 % (ref 36.3–47.1)
HDLC SERPL-MCNC: 64 MG/DL
HGB BLD-MCNC: 14 G/DL (ref 11.9–15.1)
IMM GRANULOCYTES # BLD AUTO: <0.03 K/UL (ref 0–0.3)
IMM GRANULOCYTES NFR BLD: 0 %
LDLC SERPL CALC-MCNC: 205 MG/DL (ref 0–130)
LYMPHOCYTES NFR BLD: 1.89 K/UL (ref 1.1–3.7)
LYMPHOCYTES RELATIVE PERCENT: 30 % (ref 24–43)
MAGNESIUM SERPL-MCNC: 2.2 MG/DL (ref 1.6–2.6)
MCH RBC QN AUTO: 29.7 PG (ref 25.2–33.5)
MCHC RBC AUTO-ENTMCNC: 30.6 G/DL (ref 28.4–34.8)
MCV RBC AUTO: 97.2 FL (ref 82.6–102.9)
MONOCYTES NFR BLD: 0.64 K/UL (ref 0.1–1.2)
MONOCYTES NFR BLD: 10 % (ref 3–12)
NEUTROPHILS NFR BLD: 55 % (ref 36–65)
NEUTS SEG NFR BLD: 3.43 K/UL (ref 1.5–8.1)
NRBC BLD-RTO: 0 PER 100 WBC
PLATELET # BLD AUTO: 256 K/UL (ref 138–453)
PMV BLD AUTO: 10.8 FL (ref 8.1–13.5)
POTASSIUM SERPL-SCNC: 4.6 MMOL/L (ref 3.7–5.3)
PROT SERPL-MCNC: 7.6 G/DL (ref 6.4–8.3)
RBC # BLD AUTO: 4.71 M/UL (ref 3.95–5.11)
SODIUM SERPL-SCNC: 140 MMOL/L (ref 135–144)
TRIGL SERPL-MCNC: 79 MG/DL
TSH SERPL DL<=0.05 MIU/L-ACNC: 1.59 UIU/ML (ref 0.3–5)
WBC OTHER # BLD: 6.3 K/UL (ref 3.5–11.3)

## 2023-08-04 PROCEDURE — 36415 COLL VENOUS BLD VENIPUNCTURE: CPT | Performed by: NURSE PRACTITIONER

## 2023-08-04 PROCEDURE — 36415 COLL VENOUS BLD VENIPUNCTURE: CPT | Performed by: STUDENT IN AN ORGANIZED HEALTH CARE EDUCATION/TRAINING PROGRAM

## 2023-08-04 NOTE — PROGRESS NOTES
Venipuncture obtained from  left arm. Patient tolerated the procedure without complications or complaints.     2 PST   1 LVV

## 2023-08-06 LAB
EST. AVERAGE GLUCOSE BLD GHB EST-MCNC: 120 MG/DL
HBA1C MFR BLD: 5.8 % (ref 4–6)

## 2023-08-08 ENCOUNTER — OFFICE VISIT (OUTPATIENT)
Dept: FAMILY MEDICINE CLINIC | Age: 63
End: 2023-08-08
Payer: COMMERCIAL

## 2023-08-08 VITALS
RESPIRATION RATE: 16 BRPM | DIASTOLIC BLOOD PRESSURE: 66 MMHG | HEART RATE: 53 BPM | SYSTOLIC BLOOD PRESSURE: 118 MMHG | TEMPERATURE: 97.3 F | WEIGHT: 148.8 LBS | BODY MASS INDEX: 26.36 KG/M2 | OXYGEN SATURATION: 98 %

## 2023-08-08 DIAGNOSIS — M54.41 CHRONIC RIGHT-SIDED LOW BACK PAIN WITH RIGHT-SIDED SCIATICA: ICD-10-CM

## 2023-08-08 DIAGNOSIS — M79.7 FIBROMYALGIA: ICD-10-CM

## 2023-08-08 DIAGNOSIS — M25.551 RIGHT HIP PAIN: ICD-10-CM

## 2023-08-08 DIAGNOSIS — Z12.31 ENCOUNTER FOR SCREENING MAMMOGRAM FOR MALIGNANT NEOPLASM OF BREAST: ICD-10-CM

## 2023-08-08 DIAGNOSIS — Z00.00 ANNUAL PHYSICAL EXAM: Primary | ICD-10-CM

## 2023-08-08 DIAGNOSIS — G89.29 CHRONIC RIGHT-SIDED LOW BACK PAIN WITH RIGHT-SIDED SCIATICA: ICD-10-CM

## 2023-08-08 DIAGNOSIS — Z79.899 MEDICAL MARIJUANA USE: ICD-10-CM

## 2023-08-08 DIAGNOSIS — E78.00 PURE HYPERCHOLESTEROLEMIA: ICD-10-CM

## 2023-08-08 DIAGNOSIS — I10 ESSENTIAL HYPERTENSION: ICD-10-CM

## 2023-08-08 DIAGNOSIS — M76.891 HIP FLEXOR TENDONITIS, RIGHT: ICD-10-CM

## 2023-08-08 DIAGNOSIS — F34.1 DYSTHYMIA: ICD-10-CM

## 2023-08-08 DIAGNOSIS — Z86.39 HISTORY OF VITAMIN D DEFICIENCY: ICD-10-CM

## 2023-08-08 DIAGNOSIS — K21.9 GASTROESOPHAGEAL REFLUX DISEASE, UNSPECIFIED WHETHER ESOPHAGITIS PRESENT: ICD-10-CM

## 2023-08-08 DIAGNOSIS — R73.03 PREDIABETES: ICD-10-CM

## 2023-08-08 PROCEDURE — 3078F DIAST BP <80 MM HG: CPT | Performed by: NURSE PRACTITIONER

## 2023-08-08 PROCEDURE — 99396 PREV VISIT EST AGE 40-64: CPT | Performed by: NURSE PRACTITIONER

## 2023-08-08 PROCEDURE — 3074F SYST BP LT 130 MM HG: CPT | Performed by: NURSE PRACTITIONER

## 2023-08-08 RX ORDER — ONDANSETRON 4 MG/1
4 TABLET, FILM COATED ORAL 3 TIMES DAILY PRN
Qty: 30 TABLET | Refills: 1 | Status: SHIPPED | OUTPATIENT
Start: 2023-08-08

## 2023-08-08 RX ORDER — CITALOPRAM 40 MG/1
40 TABLET ORAL DAILY
Qty: 90 TABLET | Refills: 1 | Status: SHIPPED | OUTPATIENT
Start: 2023-08-08

## 2023-08-08 RX ORDER — NAPROXEN 500 MG/1
500 TABLET ORAL 2 TIMES DAILY WITH MEALS
Qty: 20 TABLET | Refills: 0 | Status: CANCELLED | OUTPATIENT
Start: 2023-08-08 | End: 2023-08-18

## 2023-08-08 RX ORDER — PRAVASTATIN SODIUM 80 MG/1
80 TABLET ORAL DAILY
Qty: 30 TABLET | Refills: 0 | Status: SHIPPED | OUTPATIENT
Start: 2023-08-08 | End: 2023-08-08 | Stop reason: ALTCHOICE

## 2023-08-08 RX ORDER — ROSUVASTATIN CALCIUM 40 MG/1
40 TABLET, COATED ORAL DAILY
Qty: 30 TABLET | Refills: 5 | Status: SHIPPED | OUTPATIENT
Start: 2023-08-08

## 2023-08-08 RX ORDER — SUCRALFATE ORAL 1 G/10ML
1 SUSPENSION ORAL 4 TIMES DAILY
Qty: 1200 ML | Refills: 3 | Status: SHIPPED | OUTPATIENT
Start: 2023-08-08

## 2023-08-08 RX ORDER — LISINOPRIL 20 MG/1
TABLET ORAL
Qty: 90 TABLET | Refills: 0 | Status: SHIPPED | OUTPATIENT
Start: 2023-08-08

## 2023-08-08 RX ORDER — PANTOPRAZOLE SODIUM 40 MG/1
40 TABLET, DELAYED RELEASE ORAL
Qty: 30 TABLET | Refills: 3 | Status: SHIPPED | OUTPATIENT
Start: 2023-08-08

## 2023-08-08 SDOH — ECONOMIC STABILITY: HOUSING INSECURITY
IN THE LAST 12 MONTHS, WAS THERE A TIME WHEN YOU DID NOT HAVE A STEADY PLACE TO SLEEP OR SLEPT IN A SHELTER (INCLUDING NOW)?: NO

## 2023-08-08 SDOH — ECONOMIC STABILITY: INCOME INSECURITY: HOW HARD IS IT FOR YOU TO PAY FOR THE VERY BASICS LIKE FOOD, HOUSING, MEDICAL CARE, AND HEATING?: NOT HARD AT ALL

## 2023-08-08 SDOH — ECONOMIC STABILITY: FOOD INSECURITY: WITHIN THE PAST 12 MONTHS, THE FOOD YOU BOUGHT JUST DIDN'T LAST AND YOU DIDN'T HAVE MONEY TO GET MORE.: NEVER TRUE

## 2023-08-08 SDOH — ECONOMIC STABILITY: FOOD INSECURITY: WITHIN THE PAST 12 MONTHS, YOU WORRIED THAT YOUR FOOD WOULD RUN OUT BEFORE YOU GOT MONEY TO BUY MORE.: NEVER TRUE

## 2023-08-08 ASSESSMENT — ENCOUNTER SYMPTOMS
COUGH: 0
CHEST TIGHTNESS: 0
DIARRHEA: 0
SHORTNESS OF BREATH: 0
CONSTIPATION: 0
RHINORRHEA: 0
EYE DISCHARGE: 0
ABDOMINAL PAIN: 0
VOMITING: 0

## 2023-08-08 ASSESSMENT — PATIENT HEALTH QUESTIONNAIRE - PHQ9: DEPRESSION UNABLE TO ASSESS: PT REFUSES

## 2023-08-08 NOTE — PROGRESS NOTES
Morena Simmons [address], [address]. [address]  Dept: [phone]  Dept Fax: [phone]    Visit type: Established patient    Reason for Visit: No chief complaint on file. Assessment and Plan       1. Annual physical exam  2. Encounter for screening mammogram for malignant neoplasm of breast  3. Essential hypertension  The following orders have not been finalized:  -     lisinopril (PRINIVIL;ZESTRIL) 20 MG tablet  4. Prediabetes  5. History of vitamin D deficiency  6. Gastroesophageal reflux disease, unspecified whether esophagitis present  The following orders have not been finalized:  -     sucralfate (CARAFATE) 1 GM/10ML suspension  -     pantoprazole (PROTONIX) 40 MG tablet  7. Fibromyalgia  The following orders have not been finalized:  -     ondansetron (ZOFRAN) 4 MG tablet  8. Medical marijuana use  9. Chronic right-sided low back pain with right-sided sciatica  10. Dysthymia  The following orders have not been finalized:  -     citalopram (CELEXA) 40 MG tablet  11. Right hip pain  The following orders have not been finalized:  -     naproxen (NAPROSYN) 500 MG tablet  12. Hip flexor tendonitis, right  The following orders have not been finalized:  -     naproxen (NAPROSYN) 500 MG tablet  13. Pure hypercholesterolemia  The following orders have not been finalized:  -     pravastatin (PRAVACHOL) 80 MG tablet    Due for PAP and lung CT - can establish with OBGYN and lung ct ordered   Due for mammogram in October   Can get shingles vaccine at pharmacy   Reviewed labs   No longer taking naproxen  Can take ocean spray saline for epistaxis   Change pravachol to crestor   Prediabetes stable  AHA/ ADA diet     No follow-ups on file. Subjective       Here for annual exam    Working [employer] in [employer]- is up to date, goes tomorrow   Eye- is up to date     Fibromyalgia.    Onset years ago  Failed past treatments- motrin and naprosyn  pain

## 2023-11-16 ENCOUNTER — OFFICE VISIT (OUTPATIENT)
Dept: FAMILY MEDICINE CLINIC | Age: 63
End: 2023-11-16

## 2023-11-16 VITALS
OXYGEN SATURATION: 97 % | BODY MASS INDEX: 25.65 KG/M2 | WEIGHT: 144.8 LBS | SYSTOLIC BLOOD PRESSURE: 127 MMHG | TEMPERATURE: 97.1 F | RESPIRATION RATE: 16 BRPM | HEART RATE: 59 BPM | DIASTOLIC BLOOD PRESSURE: 74 MMHG

## 2023-11-16 DIAGNOSIS — I10 ESSENTIAL HYPERTENSION: ICD-10-CM

## 2023-11-16 DIAGNOSIS — I49.8 PERIODIC HEART FLUTTER: ICD-10-CM

## 2023-11-16 DIAGNOSIS — Z87.891 PERSONAL HISTORY OF TOBACCO USE: ICD-10-CM

## 2023-11-16 DIAGNOSIS — F12.90 CURRENT EVERY DAY CANNABIS VAPING: ICD-10-CM

## 2023-11-16 DIAGNOSIS — E78.00 PURE HYPERCHOLESTEROLEMIA: ICD-10-CM

## 2023-11-16 DIAGNOSIS — Z87.891 HISTORY OF TOBACCO ABUSE: ICD-10-CM

## 2023-11-16 DIAGNOSIS — Z82.49 FAMILY HISTORY OF CORONARY ARTERY DISEASE IN MOTHER: Primary | ICD-10-CM

## 2023-11-16 ASSESSMENT — ENCOUNTER SYMPTOMS
CONSTIPATION: 0
CHEST TIGHTNESS: 0
VOMITING: 0
ABDOMINAL PAIN: 0
DIARRHEA: 0
RHINORRHEA: 0
SHORTNESS OF BREATH: 0
COUGH: 0
EYE DISCHARGE: 0

## 2023-11-16 ASSESSMENT — PATIENT HEALTH QUESTIONNAIRE - PHQ9
2. FEELING DOWN, DEPRESSED OR HOPELESS: 0
8. MOVING OR SPEAKING SO SLOWLY THAT OTHER PEOPLE COULD HAVE NOTICED. OR THE OPPOSITE, BEING SO FIGETY OR RESTLESS THAT YOU HAVE BEEN MOVING AROUND A LOT MORE THAN USUAL: 0
7. TROUBLE CONCENTRATING ON THINGS, SUCH AS READING THE NEWSPAPER OR WATCHING TELEVISION: 0
6. FEELING BAD ABOUT YOURSELF - OR THAT YOU ARE A FAILURE OR HAVE LET YOURSELF OR YOUR FAMILY DOWN: 0
5. POOR APPETITE OR OVEREATING: 0
SUM OF ALL RESPONSES TO PHQ QUESTIONS 1-9: 0
4. FEELING TIRED OR HAVING LITTLE ENERGY: 0
1. LITTLE INTEREST OR PLEASURE IN DOING THINGS: 0
3. TROUBLE FALLING OR STAYING ASLEEP: 0
SUM OF ALL RESPONSES TO PHQ QUESTIONS 1-9: 0
9. THOUGHTS THAT YOU WOULD BE BETTER OFF DEAD, OR OF HURTING YOURSELF: 0
SUM OF ALL RESPONSES TO PHQ QUESTIONS 1-9: 0
SUM OF ALL RESPONSES TO PHQ9 QUESTIONS 1 & 2: 0
SUM OF ALL RESPONSES TO PHQ QUESTIONS 1-9: 0
10. IF YOU CHECKED OFF ANY PROBLEMS, HOW DIFFICULT HAVE THESE PROBLEMS MADE IT FOR YOU TO DO YOUR WORK, TAKE CARE OF THINGS AT HOME, OR GET ALONG WITH OTHER PEOPLE: 0

## 2023-11-16 NOTE — PROGRESS NOTES
"[employer] Plants [address], [place] [address] [address]. [address]  Dept: [phone]  Dept Fax: [phone]    Visit type: Established patient    Reason for Visit: Cholesterol Problem (Follow up ), Testing (Testing - PT would like her to have a stress test ), and Health Maintenance (Vaccines; Low Dose CT Lung Screening; Cervical Cancer Screening )         Assessment and Plan       1. Family history of coronary artery disease in mother  -     Vascular duplex carotid bilateral; Future  -     Echo (TTE) complete (PRN contrast/bubble/strain/3D); Future  -     Nuclear stress test with myocardial perfusion; Future  2. Essential hypertension  -     Echo (TTE) complete (PRN contrast/bubble/strain/3D); Future  -     Nuclear stress test with myocardial perfusion; Future  3. Pure hypercholesterolemia  -     Echo (TTE) complete (PRN contrast/bubble/strain/3D); Future  -     Nuclear stress test with myocardial perfusion; Future  4. History of tobacco abuse  -     Echo (TTE) complete (PRN contrast/bubble/strain/3D); Future  -     Nuclear stress test with myocardial perfusion; Future  5. Current every day cannabis vaping  -     Echo (TTE) complete (PRN contrast/bubble/strain/3D); Future  -     Nuclear stress test with myocardial perfusion; Future  6. Personal history of tobacco use  -     MO VISIT TO DISCUSS LUNG CA SCREEN W LDCT  -     CT Lung Screen (Initial/Annual/Baseline); Future  7. Periodic heart flutter  -     Cardiac holter monitor (<= 48 hours); Future    Is willing now to get lung ct screening completed  Is no longer smoking tobacco   Is trying to change from vaping marijuana to gummies  Further cardiac workup recommended as listed above  Return for [facility] .        Subjective       History of HLD  Is taking crestor  Family history of CAD with mother   Will have a \"panic attack\" in her chest area and fluttering - will happen every day and last   This happens after strenuous activity   No

## 2023-11-27 ENCOUNTER — HOSPITAL ENCOUNTER (OUTPATIENT)
Dept: CT IMAGING | Age: 63
Discharge: HOME | End: 2023-11-27
Payer: COMMERCIAL

## 2023-11-27 DIAGNOSIS — Z87.891 PERSONAL HISTORY OF TOBACCO USE: ICD-10-CM

## 2023-11-27 PROCEDURE — 71271 CT THORAX LUNG CANCER SCR C-: CPT

## 2023-12-04 ENCOUNTER — HOSPITAL ENCOUNTER (OUTPATIENT)
Dept: INTERVENTIONAL RADIOLOGY/VASCULAR | Age: 63
Discharge: HOME | End: 2023-12-06
Payer: COMMERCIAL

## 2023-12-04 ENCOUNTER — HOSPITAL ENCOUNTER (OUTPATIENT)
Age: 63
Discharge: HOME | End: 2023-12-06
Payer: COMMERCIAL

## 2023-12-04 ENCOUNTER — HOSPITAL ENCOUNTER (OUTPATIENT)
Dept: NUCLEAR MEDICINE | Age: 63
Discharge: HOME | End: 2023-12-04
Payer: COMMERCIAL

## 2023-12-04 VITALS
HEIGHT: 63 IN | SYSTOLIC BLOOD PRESSURE: 127 MMHG | WEIGHT: 144 LBS | DIASTOLIC BLOOD PRESSURE: 74 MMHG | BODY MASS INDEX: 25.52 KG/M2

## 2023-12-04 DIAGNOSIS — E78.00 PURE HYPERCHOLESTEROLEMIA: ICD-10-CM

## 2023-12-04 DIAGNOSIS — F12.90 CURRENT EVERY DAY CANNABIS VAPING: ICD-10-CM

## 2023-12-04 DIAGNOSIS — Z82.49 FAMILY HISTORY OF CORONARY ARTERY DISEASE IN MOTHER: ICD-10-CM

## 2023-12-04 DIAGNOSIS — Z87.891 HISTORY OF TOBACCO ABUSE: ICD-10-CM

## 2023-12-04 DIAGNOSIS — I10 ESSENTIAL HYPERTENSION: ICD-10-CM

## 2023-12-04 DIAGNOSIS — I49.8 PERIODIC HEART FLUTTER: ICD-10-CM

## 2023-12-04 LAB
ECHO AO ASC DIAM: 2.8 CM
ECHO AO ASCENDING AORTA INDEX: 1.67 CM/M2
ECHO AV CUSP MM: 1.9 CM
ECHO AV PEAK GRADIENT: 6 MMHG
ECHO AV PEAK VELOCITY: 1.2 M/S
ECHO AV VELOCITY RATIO: 0.92
ECHO BSA: 1.7 M2
ECHO EST RA PRESSURE: 5 MMHG
ECHO LA AREA 2C: 12.1 CM2
ECHO LA AREA 4C: 11.2 CM2
ECHO LA DIAMETER INDEX: 2.02 CM/M2
ECHO LA DIAMETER: 3.4 CM
ECHO LA MAJOR AXIS: 4.2 CM
ECHO LA MINOR AXIS: 4.5 CM
ECHO LA VOL BP: 25 ML (ref 22–52)
ECHO LA VOL MOD A2C: 26 ML (ref 22–52)
ECHO LA VOL MOD A4C: 23 ML (ref 22–52)
ECHO LA VOL/BSA BIPLANE: 15 ML/M2 (ref 16–34)
ECHO LA VOLUME INDEX MOD A2C: 15 ML/M2 (ref 16–34)
ECHO LA VOLUME INDEX MOD A4C: 14 ML/M2 (ref 16–34)
ECHO LV E' LATERAL VELOCITY: 11 CM/S
ECHO LV E' SEPTAL VELOCITY: 9 CM/S
ECHO LV FRACTIONAL SHORTENING: 27 % (ref 28–44)
ECHO LV INTERNAL DIMENSION DIASTOLE INDEX: 2.86 CM/M2
ECHO LV INTERNAL DIMENSION DIASTOLIC: 4.8 CM (ref 3.9–5.3)
ECHO LV INTERNAL DIMENSION SYSTOLIC INDEX: 2.08 CM/M2
ECHO LV INTERNAL DIMENSION SYSTOLIC: 3.5 CM
ECHO LV ISOVOLUMETRIC RELAXATION TIME (IVRT): 92 MS
ECHO LV IVSD: 0.9 CM (ref 0.6–0.9)
ECHO LV MASS 2D: 170.2 G (ref 67–162)
ECHO LV MASS INDEX 2D: 101.3 G/M2 (ref 43–95)
ECHO LV POSTERIOR WALL DIASTOLIC: 1.1 CM (ref 0.6–0.9)
ECHO LV RELATIVE WALL THICKNESS RATIO: 0.46
ECHO LVOT PEAK GRADIENT: 4 MMHG
ECHO LVOT PEAK VELOCITY: 1.1 M/S
ECHO MV A VELOCITY: 0.59 M/S
ECHO MV E DECELERATION TIME (DT): 197 MS
ECHO MV E VELOCITY: 0.82 M/S
ECHO MV E/A RATIO: 1.39
ECHO MV E/E' LATERAL: 7.45
ECHO MV E/E' RATIO (AVERAGED): 8.28
ECHO MV REGURGITANT PEAK GRADIENT: 49 MMHG
ECHO MV REGURGITANT PEAK VELOCITY: 3.5 M/S
ECHO PV MAX VELOCITY: 0.6 M/S
ECHO PV PEAK GRADIENT: 1 MMHG
ECHO RIGHT VENTRICULAR SYSTOLIC PRESSURE (RVSP): 53 MMHG
ECHO RV INTERNAL DIMENSION: 2.7 CM
ECHO TV E WAVE: 0.5 M/S
ECHO TV REGURGITANT MAX VELOCITY: 3.46 M/S
ECHO TV REGURGITANT PEAK GRADIENT: 48 MMHG
NUC STRESS EJECTION FRACTION: 60 %
STRESS BASELINE DIAS BP: 58 MMHG
STRESS BASELINE HR: 51 BPM
STRESS BASELINE SYS BP: 118 MMHG
STRESS ST DEPRESSION: 0 MM
STRESS STAGE 1 BP: NORMAL MMHG
STRESS STAGE 1 DURATION: 1 MIN:SEC
STRESS STAGE 1 HR: 59 BPM
STRESS STAGE 2 BP: NORMAL MMHG
STRESS STAGE 2 DURATION: 1 MIN:SEC
STRESS STAGE 2 HR: 80 BPM
STRESS STAGE 3 BP: NORMAL MMHG
STRESS STAGE 3 DURATION: 1 MIN:SEC
STRESS STAGE 3 HR: 74 BPM
STRESS STAGE RECOVERY 1 BP: NORMAL MMHG
STRESS STAGE RECOVERY 1 COMMENTS: NORMAL
STRESS STAGE RECOVERY 1 DURATION: 1 MIN:SEC
STRESS STAGE RECOVERY 1 HR: 76 BPM
STRESS STAGE RECOVERY 2 BP: NORMAL MMHG
STRESS STAGE RECOVERY 2 COMMENTS: NORMAL
STRESS STAGE RECOVERY 2 DURATION: 1 MIN:SEC
STRESS STAGE RECOVERY 2 HR: 68 BPM
STRESS STAGE RECOVERY 3 BP: NORMAL MMHG
STRESS STAGE RECOVERY 3 DURATION: 1 MIN:SEC
STRESS STAGE RECOVERY 3 HR: 68 BPM
STRESS STAGE RECOVERY 4 BP: NORMAL MMHG
STRESS STAGE RECOVERY 4 COMMENTS: NORMAL
STRESS STAGE RECOVERY 4 DURATION: 2 MIN:SEC
STRESS STAGE RECOVERY 4 HR: 57 BPM
STRESS TARGET HR: 158 BPM
TID: 1.2

## 2023-12-04 PROCEDURE — 93306 TTE W/DOPPLER COMPLETE: CPT | Performed by: INTERNAL MEDICINE

## 2023-12-04 PROCEDURE — 6360000002 HC RX W HCPCS: Performed by: INTERNAL MEDICINE

## 2023-12-04 PROCEDURE — 93225 XTRNL ECG REC<48 HRS REC: CPT

## 2023-12-04 PROCEDURE — 93016 CV STRESS TEST SUPVJ ONLY: CPT | Performed by: INTERNAL MEDICINE

## 2023-12-04 PROCEDURE — 3430000000 HC RX DIAGNOSTIC RADIOPHARMACEUTICAL: Performed by: INTERNAL MEDICINE

## 2023-12-04 PROCEDURE — 93018 CV STRESS TEST I&R ONLY: CPT | Performed by: INTERNAL MEDICINE

## 2023-12-04 PROCEDURE — 93880 EXTRACRANIAL BILAT STUDY: CPT

## 2023-12-04 PROCEDURE — A9500 TC99M SESTAMIBI: HCPCS | Performed by: INTERNAL MEDICINE

## 2023-12-04 PROCEDURE — 78452 HT MUSCLE IMAGE SPECT MULT: CPT | Performed by: INTERNAL MEDICINE

## 2023-12-04 PROCEDURE — 93017 CV STRESS TEST TRACING ONLY: CPT

## 2023-12-04 PROCEDURE — 78452 HT MUSCLE IMAGE SPECT MULT: CPT

## 2023-12-04 PROCEDURE — 6360000002 HC RX W HCPCS: Performed by: NURSE PRACTITIONER

## 2023-12-04 PROCEDURE — 93306 TTE W/DOPPLER COMPLETE: CPT

## 2023-12-04 RX ORDER — TETRAKIS(2-METHOXYISOBUTYLISOCYANIDE)COPPER(I) TETRAFLUOROBORATE 1 MG/ML
30.5 INJECTION, POWDER, LYOPHILIZED, FOR SOLUTION INTRAVENOUS
Status: COMPLETED | OUTPATIENT
Start: 2023-12-04 | End: 2023-12-04

## 2023-12-04 RX ORDER — TETRAKIS(2-METHOXYISOBUTYLISOCYANIDE)COPPER(I) TETRAFLUOROBORATE 1 MG/ML
9.3 INJECTION, POWDER, LYOPHILIZED, FOR SOLUTION INTRAVENOUS
Status: COMPLETED | OUTPATIENT
Start: 2023-12-04 | End: 2023-12-04

## 2023-12-04 RX ORDER — AMINOPHYLLINE 25 MG/ML
75 INJECTION, SOLUTION INTRAVENOUS
Status: COMPLETED | OUTPATIENT
Start: 2023-12-04 | End: 2023-12-04

## 2023-12-04 RX ORDER — REGADENOSON 0.08 MG/ML
0.4 INJECTION, SOLUTION INTRAVENOUS
Status: COMPLETED | OUTPATIENT
Start: 2023-12-04 | End: 2023-12-04

## 2023-12-04 RX ADMIN — REGADENOSON 0.4 MG: 0.08 INJECTION, SOLUTION INTRAVENOUS at 13:10

## 2023-12-04 RX ADMIN — Medication 30.5 MILLICURIE: at 13:15

## 2023-12-04 RX ADMIN — Medication 9.3 MILLICURIE: at 12:22

## 2023-12-04 RX ADMIN — AMINOPHYLLINE 75 MG: 25 INJECTION, SOLUTION INTRAVENOUS at 13:20

## 2023-12-05 ENCOUNTER — TELEPHONE (OUTPATIENT)
Dept: FAMILY MEDICINE CLINIC | Age: 63
End: 2023-12-05

## 2023-12-05 NOTE — TELEPHONE ENCOUNTER
Pt called and was wondering when her Stress Test results would be resulted- cardiologist told her they would have the results for that today and to call her PCP for the results-     [phone]

## 2023-12-28 DIAGNOSIS — I10 ESSENTIAL HYPERTENSION: ICD-10-CM

## 2023-12-28 LAB
ECHO AO ASC DIAM: 2.8 CM
ECHO AO ASCENDING AORTA INDEX: 1.67 CM/M2
ECHO AV CUSP MM: 1.9 CM
ECHO AV PEAK GRADIENT: 6 MMHG
ECHO AV PEAK VELOCITY: 1.2 M/S
ECHO AV VELOCITY RATIO: 0.92
ECHO BSA: 1.7 M2
ECHO EST RA PRESSURE: 5 MMHG
ECHO LA AREA 2C: 12.1 CM2
ECHO LA AREA 4C: 11.2 CM2
ECHO LA DIAMETER INDEX: 2.02 CM/M2
ECHO LA DIAMETER: 3.4 CM
ECHO LA MAJOR AXIS: 4.2 CM
ECHO LA MINOR AXIS: 4.5 CM
ECHO LA VOL BP: 25 ML (ref 22–52)
ECHO LA VOL MOD A2C: 26 ML (ref 22–52)
ECHO LA VOL MOD A4C: 23 ML (ref 22–52)
ECHO LA VOL/BSA BIPLANE: 15 ML/M2 (ref 16–34)
ECHO LA VOLUME INDEX MOD A2C: 15 ML/M2 (ref 16–34)
ECHO LA VOLUME INDEX MOD A4C: 14 ML/M2 (ref 16–34)
ECHO LV E' LATERAL VELOCITY: 11 CM/S
ECHO LV E' SEPTAL VELOCITY: 9 CM/S
ECHO LV FRACTIONAL SHORTENING: 27 % (ref 28–44)
ECHO LV INTERNAL DIMENSION DIASTOLE INDEX: 2.86 CM/M2
ECHO LV INTERNAL DIMENSION DIASTOLIC: 4.8 CM (ref 3.9–5.3)
ECHO LV INTERNAL DIMENSION SYSTOLIC INDEX: 2.08 CM/M2
ECHO LV INTERNAL DIMENSION SYSTOLIC: 3.5 CM
ECHO LV ISOVOLUMETRIC RELAXATION TIME (IVRT): 92 MS
ECHO LV IVSD: 0.9 CM (ref 0.6–0.9)
ECHO LV MASS 2D: 170.2 G (ref 67–162)
ECHO LV MASS INDEX 2D: 101.3 G/M2 (ref 43–95)
ECHO LV POSTERIOR WALL DIASTOLIC: 1.1 CM (ref 0.6–0.9)
ECHO LV RELATIVE WALL THICKNESS RATIO: 0.46
ECHO LVOT PEAK GRADIENT: 4 MMHG
ECHO LVOT PEAK VELOCITY: 1.1 M/S
ECHO MV A VELOCITY: 0.59 M/S
ECHO MV E DECELERATION TIME (DT): 197 MS
ECHO MV E VELOCITY: 0.82 M/S
ECHO MV E/A RATIO: 1.39
ECHO MV E/E' LATERAL: 7.45
ECHO MV E/E' RATIO (AVERAGED): 8.28
ECHO MV REGURGITANT PEAK GRADIENT: 49 MMHG
ECHO MV REGURGITANT PEAK VELOCITY: 3.5 M/S
ECHO PV MAX VELOCITY: 0.6 M/S
ECHO PV PEAK GRADIENT: 1 MMHG
ECHO RIGHT VENTRICULAR SYSTOLIC PRESSURE (RVSP): 53 MMHG
ECHO RV INTERNAL DIMENSION: 2.7 CM
ECHO TV E WAVE: 0.5 M/S
ECHO TV REGURGITANT MAX VELOCITY: 3.46 M/S
ECHO TV REGURGITANT PEAK GRADIENT: 48 MMHG

## 2023-12-28 RX ORDER — LISINOPRIL 20 MG/1
TABLET ORAL
Qty: 90 TABLET | Refills: 0 | Status: SHIPPED | OUTPATIENT
Start: 2023-12-28

## 2024-01-02 LAB
ACQUISITION DURATION: NORMAL S
AVERAGE HEART RATE: 61 BPM
ECHO BSA: 1.7 M2
HOOKUP DATE: NORMAL
HOOKUP TIME: NORMAL
MAX HEART RATE TIME/DATE: NORMAL
MAX HEART RATE: 114 BPM
MIN HEART RATE TIME/DATE: NORMAL
MIN HEART RATE: 38 BPM
NUMBER OF QRS COMPLEXES: NORMAL
NUMBER OF SUPRAVENTRICULAR COUPLETS: 0
NUMBER OF SUPRAVENTRICULAR ECTOPICS: 53
NUMBER OF SUPRAVENTRICULAR ISOLATED BEATS: 34
NUMBER OF VENTRICULAR BIGEMINAL CYCLES: 0
NUMBER OF VENTRICULAR COUPLETS: 1
NUMBER OF VENTRICULAR ECTOPICS: 16

## 2024-02-01 ENCOUNTER — NURSE ONLY (OUTPATIENT)
Dept: FAMILY MEDICINE CLINIC | Age: 64
End: 2024-02-01
Payer: COMMERCIAL

## 2024-02-01 ENCOUNTER — HOSPITAL ENCOUNTER (OUTPATIENT)
Age: 64
Setting detail: SPECIMEN
Discharge: HOME | End: 2024-02-01

## 2024-02-01 DIAGNOSIS — R73.03 DIABETES MELLITUS, LATENT: Primary | ICD-10-CM

## 2024-02-01 DIAGNOSIS — E78.5 HYPERLIPIDEMIA, UNSPECIFIED HYPERLIPIDEMIA TYPE: ICD-10-CM

## 2024-02-01 PROCEDURE — 36415 COLL VENOUS BLD VENIPUNCTURE: CPT | Performed by: NURSE PRACTITIONER

## 2024-02-02 DIAGNOSIS — R73.03 PREDIABETES: ICD-10-CM

## 2024-02-02 DIAGNOSIS — E78.00 PURE HYPERCHOLESTEROLEMIA: ICD-10-CM

## 2024-02-02 LAB
ALBUMIN SERPL-MCNC: 4.3 G/DL (ref 3.5–5.2)
ALBUMIN/GLOB SERPL: 2 {RATIO} (ref 1–2.5)
ALP SERPL-CCNC: 59 U/L (ref 35–104)
ALT SERPL-CCNC: 16 U/L (ref 10–35)
ANION GAP SERPL CALCULATED.3IONS-SCNC: 10 MMOL/L (ref 9–16)
AST SERPL-CCNC: 24 U/L (ref 10–35)
BASOPHILS # BLD: 0.11 K/UL (ref 0–0.2)
BASOPHILS NFR BLD: 2 % (ref 0–2)
BILIRUB SERPL-MCNC: 0.3 MG/DL (ref 0–1.2)
BUN SERPL-MCNC: 19 MG/DL (ref 8–23)
CALCIUM SERPL-MCNC: 9.6 MG/DL (ref 8.6–10.4)
CHLORIDE SERPL-SCNC: 103 MMOL/L (ref 98–107)
CHOLEST SERPL-MCNC: 146 MG/DL (ref 0–199)
CHOLESTEROL/HDL RATIO: 2
CO2 SERPL-SCNC: 25 MMOL/L (ref 20–31)
CREAT SERPL-MCNC: 0.8 MG/DL (ref 0.5–0.9)
EOSINOPHIL # BLD: 0.29 K/UL (ref 0–0.44)
EOSINOPHILS RELATIVE PERCENT: 4 % (ref 1–4)
ERYTHROCYTE [DISTWIDTH] IN BLOOD BY AUTOMATED COUNT: 13.1 % (ref 11.8–14.4)
EST. AVERAGE GLUCOSE BLD GHB EST-MCNC: 120 MG/DL
GFR SERPL CREATININE-BSD FRML MDRD: >60 ML/MIN/1.73M2
GLUCOSE SERPL-MCNC: 101 MG/DL (ref 74–99)
HBA1C MFR BLD: 5.8 % (ref 4–6)
HCT VFR BLD AUTO: 42 % (ref 36.3–47.1)
HDLC SERPL-MCNC: 66 MG/DL
HGB BLD-MCNC: 13.3 G/DL (ref 11.9–15.1)
IMM GRANULOCYTES # BLD AUTO: <0.03 K/UL (ref 0–0.3)
IMM GRANULOCYTES NFR BLD: 0 %
LDLC SERPL CALC-MCNC: 70 MG/DL (ref 0–100)
LYMPHOCYTES NFR BLD: 2.36 K/UL (ref 1.1–3.7)
LYMPHOCYTES RELATIVE PERCENT: 32 % (ref 24–43)
MCH RBC QN AUTO: 29.4 PG (ref 25.2–33.5)
MCHC RBC AUTO-ENTMCNC: 31.7 G/DL (ref 28.4–34.8)
MCV RBC AUTO: 92.9 FL (ref 82.6–102.9)
MONOCYTES NFR BLD: 0.72 K/UL (ref 0.1–1.2)
MONOCYTES NFR BLD: 10 % (ref 3–12)
NEUTROPHILS NFR BLD: 52 % (ref 36–65)
NEUTS SEG NFR BLD: 3.82 K/UL (ref 1.5–8.1)
NRBC BLD-RTO: 0 PER 100 WBC
PLATELET # BLD AUTO: 305 K/UL (ref 138–453)
PMV BLD AUTO: 11.5 FL (ref 8.1–13.5)
POTASSIUM SERPL-SCNC: 5.1 MMOL/L (ref 3.7–5.3)
PROT SERPL-MCNC: 7 G/DL (ref 6.6–8.7)
RBC # BLD AUTO: 4.52 M/UL (ref 3.95–5.11)
SODIUM SERPL-SCNC: 138 MMOL/L (ref 136–145)
TRIGL SERPL-MCNC: 49 MG/DL
VLDLC SERPL CALC-MCNC: 10 MG/DL
WBC OTHER # BLD: 7.3 K/UL (ref 3.5–11.3)

## 2024-02-08 ENCOUNTER — OFFICE VISIT (OUTPATIENT)
Dept: FAMILY MEDICINE CLINIC | Age: 64
End: 2024-02-08
Payer: COMMERCIAL

## 2024-02-08 VITALS
HEART RATE: 81 BPM | WEIGHT: 147.2 LBS | SYSTOLIC BLOOD PRESSURE: 126 MMHG | OXYGEN SATURATION: 96 % | RESPIRATION RATE: 18 BRPM | BODY MASS INDEX: 26.08 KG/M2 | TEMPERATURE: 97.4 F | DIASTOLIC BLOOD PRESSURE: 70 MMHG

## 2024-02-08 DIAGNOSIS — F34.1 DYSTHYMIA: ICD-10-CM

## 2024-02-08 DIAGNOSIS — E78.00 PURE HYPERCHOLESTEROLEMIA: ICD-10-CM

## 2024-02-08 DIAGNOSIS — R73.03 PREDIABETES: ICD-10-CM

## 2024-02-08 DIAGNOSIS — I10 ESSENTIAL HYPERTENSION: Primary | ICD-10-CM

## 2024-02-08 DIAGNOSIS — F12.90 CURRENT EVERY DAY CANNABIS VAPING: ICD-10-CM

## 2024-02-08 DIAGNOSIS — K21.9 GASTROESOPHAGEAL REFLUX DISEASE, UNSPECIFIED WHETHER ESOPHAGITIS PRESENT: ICD-10-CM

## 2024-02-08 DIAGNOSIS — Z87.891 HISTORY OF TOBACCO ABUSE: ICD-10-CM

## 2024-02-08 PROCEDURE — G8419 CALC BMI OUT NRM PARAM NOF/U: HCPCS | Performed by: NURSE PRACTITIONER

## 2024-02-08 PROCEDURE — 3074F SYST BP LT 130 MM HG: CPT | Performed by: NURSE PRACTITIONER

## 2024-02-08 PROCEDURE — 99214 OFFICE O/P EST MOD 30 MIN: CPT | Performed by: NURSE PRACTITIONER

## 2024-02-08 PROCEDURE — 3078F DIAST BP <80 MM HG: CPT | Performed by: NURSE PRACTITIONER

## 2024-02-08 PROCEDURE — 3017F COLORECTAL CA SCREEN DOC REV: CPT | Performed by: NURSE PRACTITIONER

## 2024-02-08 PROCEDURE — 1036F TOBACCO NON-USER: CPT | Performed by: NURSE PRACTITIONER

## 2024-02-08 PROCEDURE — G8427 DOCREV CUR MEDS BY ELIG CLIN: HCPCS | Performed by: NURSE PRACTITIONER

## 2024-02-08 PROCEDURE — G8484 FLU IMMUNIZE NO ADMIN: HCPCS | Performed by: NURSE PRACTITIONER

## 2024-02-08 RX ORDER — PANTOPRAZOLE SODIUM 40 MG/1
40 TABLET, DELAYED RELEASE ORAL
Qty: 90 TABLET | Refills: 1 | Status: SHIPPED | OUTPATIENT
Start: 2024-02-08

## 2024-02-08 RX ORDER — ROSUVASTATIN CALCIUM 40 MG/1
40 TABLET, COATED ORAL DAILY
Qty: 90 TABLET | Refills: 1 | Status: SHIPPED | OUTPATIENT
Start: 2024-02-08

## 2024-02-08 RX ORDER — LISINOPRIL 20 MG/1
20 TABLET ORAL DAILY
Qty: 90 TABLET | Refills: 1 | Status: SHIPPED | OUTPATIENT
Start: 2024-02-08

## 2024-02-08 RX ORDER — CITALOPRAM 40 MG/1
40 TABLET ORAL DAILY
Qty: 90 TABLET | Refills: 1 | Status: SHIPPED | OUTPATIENT
Start: 2024-02-08

## 2024-02-08 ASSESSMENT — PATIENT HEALTH QUESTIONNAIRE - PHQ9
SUM OF ALL RESPONSES TO PHQ9 QUESTIONS 1 & 2: 0
8. MOVING OR SPEAKING SO SLOWLY THAT OTHER PEOPLE COULD HAVE NOTICED. OR THE OPPOSITE, BEING SO FIGETY OR RESTLESS THAT YOU HAVE BEEN MOVING AROUND A LOT MORE THAN USUAL: 0
4. FEELING TIRED OR HAVING LITTLE ENERGY: 0
SUM OF ALL RESPONSES TO PHQ QUESTIONS 1-9: 0
2. FEELING DOWN, DEPRESSED OR HOPELESS: 0
SUM OF ALL RESPONSES TO PHQ QUESTIONS 1-9: 0
5. POOR APPETITE OR OVEREATING: 0
1. LITTLE INTEREST OR PLEASURE IN DOING THINGS: 0
10. IF YOU CHECKED OFF ANY PROBLEMS, HOW DIFFICULT HAVE THESE PROBLEMS MADE IT FOR YOU TO DO YOUR WORK, TAKE CARE OF THINGS AT HOME, OR GET ALONG WITH OTHER PEOPLE: 0
9. THOUGHTS THAT YOU WOULD BE BETTER OFF DEAD, OR OF HURTING YOURSELF: 0
6. FEELING BAD ABOUT YOURSELF - OR THAT YOU ARE A FAILURE OR HAVE LET YOURSELF OR YOUR FAMILY DOWN: 0
3. TROUBLE FALLING OR STAYING ASLEEP: 0
7. TROUBLE CONCENTRATING ON THINGS, SUCH AS READING THE NEWSPAPER OR WATCHING TELEVISION: 0

## 2024-02-08 ASSESSMENT — ENCOUNTER SYMPTOMS
RHINORRHEA: 0
CHEST TIGHTNESS: 0
ABDOMINAL PAIN: 0
COUGH: 0
EYE DISCHARGE: 0
VOMITING: 0
CONSTIPATION: 0
SHORTNESS OF BREATH: 0
DIARRHEA: 0

## 2024-02-08 NOTE — PROGRESS NOTES
time.   Psychiatric:         Speech: Speech normal.         Behavior: Behavior normal.         Thought Content: Thought content normal.           Data Reviewed and Summarized       Labs:   Lab Results   Component Value Date    CHOL 146 02/01/2024    TRIG 49 02/01/2024    HDL 66 02/01/2024    LDLCHOLESTEROL 70 02/01/2024    LDLCALC 104 10/24/2018    VLDL 10 02/01/2024    CHOLHDLRATIO 2.0 02/01/2024     Lab Results   Component Value Date    TSH 1.59 08/04/2023     Lab Results   Component Value Date     02/01/2024    K 5.1 02/01/2024     02/01/2024    CO2 25 02/01/2024    BUN 19 02/01/2024    CREATININE 0.8 02/01/2024    GLUCOSE 101 (H) 02/01/2024    CALCIUM 9.6 02/01/2024    PROT 7.0 02/01/2024    LABALBU 4.3 02/01/2024    BILITOT 0.3 02/01/2024    ALKPHOS 59 02/01/2024    AST 24 02/01/2024    ALT 16 02/01/2024    LABGLOM >60 02/01/2024    GFRAA >60 03/22/2022    AGRATIO 2.0 02/01/2024       Lab Results   Component Value Date    WBC 7.3 02/01/2024    HGB 13.3 02/01/2024    HCT 42.0 02/01/2024    MCV 92.9 02/01/2024     02/01/2024     Lab Results   Component Value Date/Time    MG 2.2 08/04/2023 08:29 PM     Lab Results   Component Value Date/Time    VITD25 60.7 08/04/2023 08:29 PM        Imaging/Testing:    CT Lung Screen (Initial/Annual/Baseline)  Order: [id number]  Status: Final result       Visible to patient: No (not released)       Next appt: None       Dx: Personal history of tobacco use    3 Result Notes       1 HM Topic  Recommendations     Due   Annual CT lung screening 11/28/2024     Details    Reading Physician Reading Date Result Priority   Richmond Dykes MD 11/28/2023      Narrative & Impression  NONCONTRAST SCREENING CT CHEST:     HISTORY: History of smoking. 54 pack year history of smoking.     TECHNIQUE:   1 mm axial imaging of the chest and upper abdomen without IV contrast.      All CT scans at this facility use dose modulation, iterative reconstruction, and/or weight based

## 2024-07-11 ENCOUNTER — OFFICE VISIT (OUTPATIENT)
Dept: FAMILY MEDICINE CLINIC | Age: 64
End: 2024-07-11
Payer: COMMERCIAL

## 2024-07-11 VITALS
DIASTOLIC BLOOD PRESSURE: 74 MMHG | WEIGHT: 149 LBS | OXYGEN SATURATION: 98 % | RESPIRATION RATE: 16 BRPM | TEMPERATURE: 97.2 F | HEART RATE: 58 BPM | SYSTOLIC BLOOD PRESSURE: 128 MMHG | BODY MASS INDEX: 26.39 KG/M2

## 2024-07-11 DIAGNOSIS — R30.0 DYSURIA: Primary | ICD-10-CM

## 2024-07-11 DIAGNOSIS — I10 ELEVATED BLOOD PRESSURE READING WITH DIAGNOSIS OF HYPERTENSION: ICD-10-CM

## 2024-07-11 DIAGNOSIS — N30.90 CYSTITIS: ICD-10-CM

## 2024-07-11 LAB
BILIRUBIN, POC: NEGATIVE
BLOOD URINE, POC: ABNORMAL
CLARITY, POC: CLEAR
COLOR, POC: YELLOW
GLUCOSE URINE, POC: NEGATIVE
KETONES, POC: NEGATIVE
LEUKOCYTE EST, POC: NEGATIVE
NITRITE, POC: POSITIVE
PH, POC: 5.5
PROTEIN, POC: NEGATIVE
SPECIFIC GRAVITY, POC: 1.02
UROBILINOGEN, POC: ABNORMAL

## 2024-07-11 PROCEDURE — G8427 DOCREV CUR MEDS BY ELIG CLIN: HCPCS | Performed by: NURSE PRACTITIONER

## 2024-07-11 PROCEDURE — 3017F COLORECTAL CA SCREEN DOC REV: CPT | Performed by: NURSE PRACTITIONER

## 2024-07-11 PROCEDURE — 81003 URINALYSIS AUTO W/O SCOPE: CPT | Performed by: NURSE PRACTITIONER

## 2024-07-11 PROCEDURE — 1036F TOBACCO NON-USER: CPT | Performed by: NURSE PRACTITIONER

## 2024-07-11 PROCEDURE — 3078F DIAST BP <80 MM HG: CPT | Performed by: NURSE PRACTITIONER

## 2024-07-11 PROCEDURE — 3074F SYST BP LT 130 MM HG: CPT | Performed by: NURSE PRACTITIONER

## 2024-07-11 PROCEDURE — 99214 OFFICE O/P EST MOD 30 MIN: CPT | Performed by: NURSE PRACTITIONER

## 2024-07-11 PROCEDURE — G8419 CALC BMI OUT NRM PARAM NOF/U: HCPCS | Performed by: NURSE PRACTITIONER

## 2024-07-11 RX ORDER — NITROFURANTOIN 25; 75 MG/1; MG/1
100 CAPSULE ORAL 2 TIMES DAILY
Qty: 14 CAPSULE | Refills: 0 | Status: SHIPPED | OUTPATIENT
Start: 2024-07-11 | End: 2024-07-18

## 2024-07-11 SDOH — ECONOMIC STABILITY: FOOD INSECURITY: WITHIN THE PAST 12 MONTHS, YOU WORRIED THAT YOUR FOOD WOULD RUN OUT BEFORE YOU GOT MONEY TO BUY MORE.: NEVER TRUE

## 2024-07-11 SDOH — ECONOMIC STABILITY: FOOD INSECURITY: WITHIN THE PAST 12 MONTHS, THE FOOD YOU BOUGHT JUST DIDN'T LAST AND YOU DIDN'T HAVE MONEY TO GET MORE.: NEVER TRUE

## 2024-07-11 SDOH — ECONOMIC STABILITY: INCOME INSECURITY: HOW HARD IS IT FOR YOU TO PAY FOR THE VERY BASICS LIKE FOOD, HOUSING, MEDICAL CARE, AND HEATING?: NOT HARD AT ALL

## 2024-07-11 ASSESSMENT — PATIENT HEALTH QUESTIONNAIRE - PHQ9
9. THOUGHTS THAT YOU WOULD BE BETTER OFF DEAD, OR OF HURTING YOURSELF: NOT AT ALL
SUM OF ALL RESPONSES TO PHQ QUESTIONS 1-9: 0
8. MOVING OR SPEAKING SO SLOWLY THAT OTHER PEOPLE COULD HAVE NOTICED. OR THE OPPOSITE, BEING SO FIGETY OR RESTLESS THAT YOU HAVE BEEN MOVING AROUND A LOT MORE THAN USUAL: NOT AT ALL
SUM OF ALL RESPONSES TO PHQ QUESTIONS 1-9: 0
3. TROUBLE FALLING OR STAYING ASLEEP: NOT AT ALL
4. FEELING TIRED OR HAVING LITTLE ENERGY: NOT AT ALL
5. POOR APPETITE OR OVEREATING: NOT AT ALL
10. IF YOU CHECKED OFF ANY PROBLEMS, HOW DIFFICULT HAVE THESE PROBLEMS MADE IT FOR YOU TO DO YOUR WORK, TAKE CARE OF THINGS AT HOME, OR GET ALONG WITH OTHER PEOPLE: NOT DIFFICULT AT ALL
SUM OF ALL RESPONSES TO PHQ QUESTIONS 1-9: 0
2. FEELING DOWN, DEPRESSED OR HOPELESS: NOT AT ALL
SUM OF ALL RESPONSES TO PHQ QUESTIONS 1-9: 0
7. TROUBLE CONCENTRATING ON THINGS, SUCH AS READING THE NEWSPAPER OR WATCHING TELEVISION: NOT AT ALL
SUM OF ALL RESPONSES TO PHQ9 QUESTIONS 1 & 2: 0
1. LITTLE INTEREST OR PLEASURE IN DOING THINGS: NOT AT ALL
6. FEELING BAD ABOUT YOURSELF - OR THAT YOU ARE A FAILURE OR HAVE LET YOURSELF OR YOUR FAMILY DOWN: NOT AT ALL

## 2024-07-11 ASSESSMENT — ENCOUNTER SYMPTOMS
CONSTIPATION: 0
COUGH: 0
EYE DISCHARGE: 0
VOMITING: 0
DIARRHEA: 0
CHEST TIGHTNESS: 0
RHINORRHEA: 0
ABDOMINAL PAIN: 1
SHORTNESS OF BREATH: 0

## 2024-07-11 NOTE — PROGRESS NOTES
Health Maintenance Due   Topic Date Due    Cervical cancer screen  05/20/2017       
chest tightness and shortness of breath.    Cardiovascular:  Negative for chest pain and palpitations.   Gastrointestinal:  Positive for abdominal pain. Negative for constipation, diarrhea and vomiting.   Genitourinary:  Positive for dysuria and frequency. Negative for difficulty urinating and hematuria.   Musculoskeletal:  Negative for arthralgias and myalgias.   Skin:  Negative for rash.   Neurological:  Negative for dizziness, weakness, numbness and headaches.   Psychiatric/Behavioral:  The patient is not nervous/anxious.         Allergies   Allergen Reactions    Latex Dermatitis     From the powder in the gloves    Flexeril [Cyclobenzaprine Hcl] Other (See Comments)     \"feel like bugs crawling all over me\"    Metformin And Related Other (See Comments)     Gi cramps, nausea       Outpatient Medications Prior to Visit   Medication Sig Dispense Refill    citalopram (CELEXA) 40 MG tablet Take 1 tablet by mouth daily 90 tablet 1    lisinopril (PRINIVIL;ZESTRIL) 20 MG tablet Take 1 tablet by mouth daily 90 tablet 1    pantoprazole (PROTONIX) 40 MG tablet Take 1 tablet by mouth daily (with breakfast) 90 tablet 1    rosuvastatin (CRESTOR) 40 MG tablet Take 1 tablet by mouth daily 90 tablet 1     No facility-administered medications prior to visit.        Past Medical History:   Diagnosis Date    Back pain 2016    Depression     Fibromyalgia     Headache(784.0)     History of cold sores     Hyperlipidemia     Hypertension     Nausea 2019    Nausea & vomiting     PONV (postoperative nausea and vomiting)     Tobacco dependency         Social History     Tobacco Use    Smoking status: Former     Current packs/day: 0.00     Average packs/day: 1.5 packs/day for 36.0 years (54.0 ttl pk-yrs)     Types: Cigarettes     Start date: 2010     Quit date:      Years since quittin.5    Smokeless tobacco: Never   Substance Use Topics    Alcohol use: Yes     Comment: Rarely         Past Surgical History:   Procedure

## 2024-07-12 LAB
REASON FOR REJECTION: NORMAL
REJECTED TEST: NORMAL

## 2024-07-15 ENCOUNTER — TELEPHONE (OUTPATIENT)
Dept: FAMILY MEDICINE CLINIC | Age: 64
End: 2024-07-15

## 2024-07-15 NOTE — TELEPHONE ENCOUNTER
Specimen not received in lab on Thursday - do you want patient to come in for recollect or would you like me to call and ask if symptoms have resolved and if not have her come in for a recollect then. Please advise

## 2024-07-15 NOTE — TELEPHONE ENCOUNTER
If her symptoms were to persist then come in for recollection to culture- please let her know, thank you!

## 2024-08-15 ENCOUNTER — OFFICE VISIT (OUTPATIENT)
Dept: FAMILY MEDICINE CLINIC | Age: 64
End: 2024-08-15
Payer: COMMERCIAL

## 2024-08-15 VITALS
WEIGHT: 148 LBS | SYSTOLIC BLOOD PRESSURE: 120 MMHG | TEMPERATURE: 97.6 F | HEART RATE: 64 BPM | BODY MASS INDEX: 26.22 KG/M2 | OXYGEN SATURATION: 99 % | DIASTOLIC BLOOD PRESSURE: 70 MMHG | RESPIRATION RATE: 18 BRPM

## 2024-08-15 DIAGNOSIS — E78.00 PURE HYPERCHOLESTEROLEMIA: ICD-10-CM

## 2024-08-15 DIAGNOSIS — R73.03 PREDIABETES: ICD-10-CM

## 2024-08-15 DIAGNOSIS — Z12.31 ENCOUNTER FOR SCREENING MAMMOGRAM FOR BREAST CANCER: ICD-10-CM

## 2024-08-15 DIAGNOSIS — F34.1 DYSTHYMIA: ICD-10-CM

## 2024-08-15 DIAGNOSIS — K21.9 GASTROESOPHAGEAL REFLUX DISEASE, UNSPECIFIED WHETHER ESOPHAGITIS PRESENT: ICD-10-CM

## 2024-08-15 DIAGNOSIS — Z87.891 PERSONAL HISTORY OF TOBACCO USE: ICD-10-CM

## 2024-08-15 DIAGNOSIS — Z12.4 SCREENING FOR CERVICAL CANCER: ICD-10-CM

## 2024-08-15 DIAGNOSIS — Z00.00 ANNUAL PHYSICAL EXAM: Primary | ICD-10-CM

## 2024-08-15 DIAGNOSIS — I10 ESSENTIAL HYPERTENSION: ICD-10-CM

## 2024-08-15 PROCEDURE — G0296 VISIT TO DETERM LDCT ELIG: HCPCS | Performed by: NURSE PRACTITIONER

## 2024-08-15 PROCEDURE — 3074F SYST BP LT 130 MM HG: CPT | Performed by: NURSE PRACTITIONER

## 2024-08-15 PROCEDURE — 99396 PREV VISIT EST AGE 40-64: CPT | Performed by: NURSE PRACTITIONER

## 2024-08-15 PROCEDURE — 3078F DIAST BP <80 MM HG: CPT | Performed by: NURSE PRACTITIONER

## 2024-08-15 RX ORDER — PANTOPRAZOLE SODIUM 40 MG/1
40 TABLET, DELAYED RELEASE ORAL
Qty: 90 TABLET | Refills: 1 | Status: SHIPPED | OUTPATIENT
Start: 2024-08-15

## 2024-08-15 RX ORDER — LISINOPRIL 20 MG/1
20 TABLET ORAL DAILY
Qty: 90 TABLET | Refills: 1 | Status: SHIPPED | OUTPATIENT
Start: 2024-08-15

## 2024-08-15 RX ORDER — ROSUVASTATIN CALCIUM 40 MG/1
40 TABLET, COATED ORAL DAILY
Qty: 90 TABLET | Refills: 1 | Status: SHIPPED | OUTPATIENT
Start: 2024-08-15

## 2024-08-15 RX ORDER — CITALOPRAM 40 MG/1
40 TABLET ORAL DAILY
Qty: 90 TABLET | Refills: 1 | Status: SHIPPED | OUTPATIENT
Start: 2024-08-15

## 2024-08-15 SDOH — ECONOMIC STABILITY: FOOD INSECURITY: WITHIN THE PAST 12 MONTHS, YOU WORRIED THAT YOUR FOOD WOULD RUN OUT BEFORE YOU GOT MONEY TO BUY MORE.: PATIENT DECLINED

## 2024-08-15 SDOH — ECONOMIC STABILITY: INCOME INSECURITY: HOW HARD IS IT FOR YOU TO PAY FOR THE VERY BASICS LIKE FOOD, HOUSING, MEDICAL CARE, AND HEATING?: PATIENT DECLINED

## 2024-08-15 SDOH — ECONOMIC STABILITY: FOOD INSECURITY: WITHIN THE PAST 12 MONTHS, THE FOOD YOU BOUGHT JUST DIDN'T LAST AND YOU DIDN'T HAVE MONEY TO GET MORE.: PATIENT DECLINED

## 2024-08-15 ASSESSMENT — PATIENT HEALTH QUESTIONNAIRE - PHQ9
SUM OF ALL RESPONSES TO PHQ9 QUESTIONS 1 & 2: 0
SUM OF ALL RESPONSES TO PHQ QUESTIONS 1-9: 0
4. FEELING TIRED OR HAVING LITTLE ENERGY: NOT AT ALL
7. TROUBLE CONCENTRATING ON THINGS, SUCH AS READING THE NEWSPAPER OR WATCHING TELEVISION: NOT AT ALL
8. MOVING OR SPEAKING SO SLOWLY THAT OTHER PEOPLE COULD HAVE NOTICED. OR THE OPPOSITE, BEING SO FIGETY OR RESTLESS THAT YOU HAVE BEEN MOVING AROUND A LOT MORE THAN USUAL: NOT AT ALL
SUM OF ALL RESPONSES TO PHQ QUESTIONS 1-9: 0
2. FEELING DOWN, DEPRESSED OR HOPELESS: NOT AT ALL
6. FEELING BAD ABOUT YOURSELF - OR THAT YOU ARE A FAILURE OR HAVE LET YOURSELF OR YOUR FAMILY DOWN: NOT AT ALL
9. THOUGHTS THAT YOU WOULD BE BETTER OFF DEAD, OR OF HURTING YOURSELF: NOT AT ALL
3. TROUBLE FALLING OR STAYING ASLEEP: NOT AT ALL
10. IF YOU CHECKED OFF ANY PROBLEMS, HOW DIFFICULT HAVE THESE PROBLEMS MADE IT FOR YOU TO DO YOUR WORK, TAKE CARE OF THINGS AT HOME, OR GET ALONG WITH OTHER PEOPLE: NOT DIFFICULT AT ALL
5. POOR APPETITE OR OVEREATING: NOT AT ALL
1. LITTLE INTEREST OR PLEASURE IN DOING THINGS: NOT AT ALL

## 2024-08-15 ASSESSMENT — ENCOUNTER SYMPTOMS
COUGH: 0
CHEST TIGHTNESS: 0
DIARRHEA: 0
VOMITING: 0
RHINORRHEA: 0
CONSTIPATION: 0
SHORTNESS OF BREATH: 0
ABDOMINAL PAIN: 0
EYE DISCHARGE: 0

## 2024-08-15 NOTE — PROGRESS NOTES
Health Maintenance Due   Topic Date Due    Cervical cancer screen  05/20/2017    COVID-19 Vaccine (1 - 2023-24 season) Never done    Flu vaccine (1) Never done

## 2024-08-15 NOTE — PROGRESS NOTES
.   [facility]  [address] [place] [id number]  Dept: [phone]  Dept Fax: [phone]    Visit type: Established patient    Reason for Visit: Annual Exam (Annual ) and Health Maintenance (See note )      Assessment & Plan   Assessment and Plan       Assessment & Plan  Annual physical exam            Dysthymia   Well-controlled, continue current medications    Orders:  •  citalopram (CELEXA) 40 MG tablet; Take 1 tablet by mouth daily    Essential hypertension   Well-controlled, continue current treatment plan    Orders:  •  lisinopril (PRINIVIL;ZESTRIL) 20 MG tablet; Take 1 tablet by mouth daily  •  CBC with Auto Differential; Future  •  Lipid Panel; Future  •  TSH with Reflex; Future  •  Comprehensive Metabolic Panel, Fasting; Future  •  Magnesium; Future    Gastroesophageal reflux disease, unspecified whether esophagitis present   Well-controlled, continue current medications    Orders:  •  pantoprazole (PROTONIX) 40 MG tablet; Take 1 tablet by mouth daily (with breakfast)    Pure hypercholesterolemia   Well-controlled, continue current medications    Orders:  •  rosuvastatin (CRESTOR) 40 MG tablet; Take 1 tablet by mouth daily  •  Lipid Panel; Future    Screening for cervical cancer    Declines pap today- will get at [facility]          Encounter for screening mammogram for breast cancer       Orders:  •  LISA Digital Screen Bilateral [[id number]]; Future    Personal history of tobacco use       Orders:  •  NC VISIT TO DISCUSS LUNG CA SCREEN W LDCT  •  CT Lung Screen (Initial/Annual/Baseline); Future    Prediabetes   Well-controlled, continue current treatment plan    Orders:  •  CBC with Auto Differential; Future  •  Hemoglobin A1C; Future  •  Lipid Panel; Future  •  TSH with Reflex; Future  •  Comprehensive Metabolic Panel, Fasting; Future  •  Magnesium; Future    Labs in 6 months  Covid and flu vaccine at pharmacy- declines   Medication refilled as listed above   PAP- completed next visit

## 2024-08-15 NOTE — ASSESSMENT & PLAN NOTE
Well-controlled, continue current medications    Orders:    rosuvastatin (CRESTOR) 40 MG tablet; Take 1 tablet by mouth daily    Lipid Panel; Future

## 2024-08-15 NOTE — ASSESSMENT & PLAN NOTE
Well-controlled, continue current treatment plan    Orders:    CBC with Auto Differential; Future    Hemoglobin A1C; Future    Lipid Panel; Future    TSH with Reflex; Future    Comprehensive Metabolic Panel, Fasting; Future    Magnesium; Future

## 2025-02-16 DIAGNOSIS — I10 ESSENTIAL HYPERTENSION: ICD-10-CM

## 2025-02-16 DIAGNOSIS — F34.1 DYSTHYMIA: ICD-10-CM

## 2025-02-16 DIAGNOSIS — E78.00 PURE HYPERCHOLESTEROLEMIA: ICD-10-CM

## 2025-02-17 RX ORDER — CITALOPRAM HYDROBROMIDE 40 MG/1
40 TABLET ORAL DAILY
Qty: 90 TABLET | Refills: 1 | Status: SHIPPED | OUTPATIENT
Start: 2025-02-17

## 2025-02-17 RX ORDER — ROSUVASTATIN CALCIUM 40 MG/1
40 TABLET, COATED ORAL DAILY
Qty: 90 TABLET | Refills: 1 | Status: SHIPPED | OUTPATIENT
Start: 2025-02-17

## 2025-02-17 RX ORDER — LISINOPRIL 20 MG/1
20 TABLET ORAL DAILY
Qty: 90 TABLET | Refills: 1 | Status: SHIPPED | OUTPATIENT
Start: 2025-02-17

## 2025-02-26 ENCOUNTER — OFFICE VISIT (OUTPATIENT)
Dept: FAMILY MEDICINE CLINIC | Age: 65
End: 2025-02-26

## 2025-02-26 VITALS
HEART RATE: 68 BPM | RESPIRATION RATE: 16 BRPM | SYSTOLIC BLOOD PRESSURE: 102 MMHG | OXYGEN SATURATION: 96 % | DIASTOLIC BLOOD PRESSURE: 62 MMHG | TEMPERATURE: 97.6 F | HEIGHT: 63 IN | WEIGHT: 152 LBS | BODY MASS INDEX: 26.93 KG/M2

## 2025-02-26 DIAGNOSIS — F34.1 PERSISTENT DEPRESSIVE DISORDER: ICD-10-CM

## 2025-02-26 DIAGNOSIS — M54.41 CHRONIC RIGHT-SIDED LOW BACK PAIN WITH RIGHT-SIDED SCIATICA: Primary | ICD-10-CM

## 2025-02-26 DIAGNOSIS — K21.9 GASTROESOPHAGEAL REFLUX DISEASE, UNSPECIFIED WHETHER ESOPHAGITIS PRESENT: ICD-10-CM

## 2025-02-26 DIAGNOSIS — G89.29 CHRONIC RIGHT-SIDED LOW BACK PAIN WITH RIGHT-SIDED SCIATICA: Primary | ICD-10-CM

## 2025-02-26 DIAGNOSIS — E78.00 PURE HYPERCHOLESTEROLEMIA: ICD-10-CM

## 2025-02-26 DIAGNOSIS — R73.03 PREDIABETES: ICD-10-CM

## 2025-02-26 DIAGNOSIS — I10 ESSENTIAL HYPERTENSION: ICD-10-CM

## 2025-02-26 DIAGNOSIS — K59.01 SLOW TRANSIT CONSTIPATION: ICD-10-CM

## 2025-02-26 DIAGNOSIS — M54.50 CHRONIC BILATERAL LOW BACK PAIN WITHOUT SCIATICA: ICD-10-CM

## 2025-02-26 DIAGNOSIS — G89.29 CHRONIC BILATERAL LOW BACK PAIN WITHOUT SCIATICA: ICD-10-CM

## 2025-02-26 RX ORDER — PANTOPRAZOLE SODIUM 40 MG/1
40 TABLET, DELAYED RELEASE ORAL
Qty: 90 TABLET | Refills: 1 | Status: SHIPPED | OUTPATIENT
Start: 2025-02-26

## 2025-02-26 RX ORDER — GABAPENTIN 100 MG/1
100 CAPSULE ORAL 2 TIMES DAILY
Qty: 60 CAPSULE | Refills: 0 | Status: SHIPPED | OUTPATIENT
Start: 2025-02-26 | End: 2025-03-28

## 2025-02-26 SDOH — ECONOMIC STABILITY: FOOD INSECURITY: WITHIN THE PAST 12 MONTHS, YOU WORRIED THAT YOUR FOOD WOULD RUN OUT BEFORE YOU GOT MONEY TO BUY MORE.: NEVER TRUE

## 2025-02-26 SDOH — ECONOMIC STABILITY: FOOD INSECURITY: WITHIN THE PAST 12 MONTHS, THE FOOD YOU BOUGHT JUST DIDN'T LAST AND YOU DIDN'T HAVE MONEY TO GET MORE.: NEVER TRUE

## 2025-02-26 ASSESSMENT — PATIENT HEALTH QUESTIONNAIRE - PHQ9
3. TROUBLE FALLING OR STAYING ASLEEP: NOT AT ALL
SUM OF ALL RESPONSES TO PHQ9 QUESTIONS 1 & 2: 0
8. MOVING OR SPEAKING SO SLOWLY THAT OTHER PEOPLE COULD HAVE NOTICED. OR THE OPPOSITE, BEING SO FIGETY OR RESTLESS THAT YOU HAVE BEEN MOVING AROUND A LOT MORE THAN USUAL: NOT AT ALL
SUM OF ALL RESPONSES TO PHQ QUESTIONS 1-9: 0
SUM OF ALL RESPONSES TO PHQ QUESTIONS 1-9: 0
7. TROUBLE CONCENTRATING ON THINGS, SUCH AS READING THE NEWSPAPER OR WATCHING TELEVISION: NOT AT ALL
5. POOR APPETITE OR OVEREATING: NOT AT ALL
10. IF YOU CHECKED OFF ANY PROBLEMS, HOW DIFFICULT HAVE THESE PROBLEMS MADE IT FOR YOU TO DO YOUR WORK, TAKE CARE OF THINGS AT HOME, OR GET ALONG WITH OTHER PEOPLE: NOT DIFFICULT AT ALL
1. LITTLE INTEREST OR PLEASURE IN DOING THINGS: NOT AT ALL
SUM OF ALL RESPONSES TO PHQ QUESTIONS 1-9: 0
6. FEELING BAD ABOUT YOURSELF - OR THAT YOU ARE A FAILURE OR HAVE LET YOURSELF OR YOUR FAMILY DOWN: NOT AT ALL
2. FEELING DOWN, DEPRESSED OR HOPELESS: NOT AT ALL
SUM OF ALL RESPONSES TO PHQ QUESTIONS 1-9: 0
9. THOUGHTS THAT YOU WOULD BE BETTER OFF DEAD, OR OF HURTING YOURSELF: NOT AT ALL
4. FEELING TIRED OR HAVING LITTLE ENERGY: NOT AT ALL

## 2025-02-26 NOTE — PROGRESS NOTES
Health Maintenance Due   Topic Date Due    Shingles vaccine (1 of 2) Never done    Pneumococcal 50+ years Vaccine (1 of 1 - PCV) Never done    Cervical cancer screen  05/20/2017    COVID-19 Vaccine (1 - 2024-25 season) Never done    Breast cancer screen  10/12/2024    Lung Cancer Screening &/or Counseling  11/27/2024    A1C test (Diabetic or Prediabetic)  02/01/2025    Lipids  02/01/2025

## 2025-02-27 DIAGNOSIS — E78.00 PURE HYPERCHOLESTEROLEMIA: ICD-10-CM

## 2025-02-27 DIAGNOSIS — I10 ESSENTIAL HYPERTENSION: ICD-10-CM

## 2025-02-27 DIAGNOSIS — R73.03 PREDIABETES: ICD-10-CM

## 2025-02-27 LAB
ALBUMIN SERPL BCG-MCNC: 4.4 G/DL (ref 3.4–4.9)
ALP SERPL-CCNC: 60 U/L (ref 35–104)
ALT SERPL W/O P-5'-P-CCNC: 17 U/L (ref 10–35)
ANION GAP SERPL CALC-SCNC: 11 MEQ/L (ref 8–16)
AST SERPL-CCNC: 20 U/L (ref 10–35)
BASOPHILS ABSOLUTE: 0.1 THOU/MM3 (ref 0–0.1)
BASOPHILS NFR BLD AUTO: 0.9 %
BILIRUB SERPL-MCNC: < 0.2 MG/DL (ref 0.3–1.2)
BUN SERPL-MCNC: 25 MG/DL (ref 8–23)
CALCIUM SERPL-MCNC: 9.4 MG/DL (ref 8.8–10.2)
CHLORIDE SERPL-SCNC: 104 MEQ/L (ref 98–111)
CHOLEST SERPL-MCNC: 155 MG/DL (ref 100–199)
CO2 SERPL-SCNC: 24 MEQ/L (ref 22–29)
CREAT SERPL-MCNC: 0.8 MG/DL (ref 0.5–0.9)
DEPRECATED MEAN GLUCOSE BLD GHB EST-ACNC: 123 MG/DL (ref 70–126)
DEPRECATED RDW RBC AUTO: 42.9 FL (ref 35–45)
EOSINOPHIL NFR BLD AUTO: 3.6 %
EOSINOPHILS ABSOLUTE: 0.3 THOU/MM3 (ref 0–0.4)
ERYTHROCYTE [DISTWIDTH] IN BLOOD BY AUTOMATED COUNT: 13 % (ref 11.5–14.5)
GFR SERPL CREATININE-BSD FRML MDRD: 83 ML/MIN/1.73M2
GLUCOSE FASTING: 101 MG/DL (ref 74–109)
HBA1C MFR BLD HPLC: 6.1 % (ref 4–6)
HCT VFR BLD AUTO: 41.2 % (ref 37–47)
HDLC SERPL-MCNC: 62 MG/DL
HGB BLD-MCNC: 13.2 GM/DL (ref 12–16)
IMM GRANULOCYTES # BLD AUTO: 0.03 THOU/MM3 (ref 0–0.07)
IMM GRANULOCYTES NFR BLD AUTO: 0.4 %
LDLC SERPL CALC-MCNC: 83 MG/DL
LYMPHOCYTES ABSOLUTE: 2.1 THOU/MM3 (ref 1–4.8)
LYMPHOCYTES NFR BLD AUTO: 29 %
MAGNESIUM SERPL-MCNC: 2.1 MG/DL (ref 1.6–2.6)
MCH RBC QN AUTO: 29.4 PG (ref 26–33)
MCHC RBC AUTO-ENTMCNC: 32 GM/DL (ref 32.2–35.5)
MCV RBC AUTO: 91.8 FL (ref 81–99)
MONOCYTES ABSOLUTE: 0.8 THOU/MM3 (ref 0.4–1.3)
MONOCYTES NFR BLD AUTO: 10.2 %
NEUTROPHILS ABSOLUTE: 4.1 THOU/MM3 (ref 1.8–7.7)
NEUTROPHILS NFR BLD AUTO: 55.9 %
NRBC BLD AUTO-RTO: 0 /100 WBC
PLATELET # BLD AUTO: 308 THOU/MM3 (ref 130–400)
PMV BLD AUTO: 11.4 FL (ref 9.4–12.4)
POTASSIUM SERPL-SCNC: 4.8 MEQ/L (ref 3.5–5.2)
PROT SERPL-MCNC: 6.7 G/DL (ref 6.4–8.3)
RBC # BLD AUTO: 4.49 MILL/MM3 (ref 4.2–5.4)
SODIUM SERPL-SCNC: 139 MEQ/L (ref 135–145)
TRIGL SERPL-MCNC: 49 MG/DL (ref 0–199)
TSH SERPL DL<=0.05 MIU/L-ACNC: 1.06 UIU/ML (ref 0.27–4.2)
WBC # BLD AUTO: 7.4 THOU/MM3 (ref 4.8–10.8)

## 2025-02-27 ASSESSMENT — ENCOUNTER SYMPTOMS
DIARRHEA: 0
RHINORRHEA: 0
EYE DISCHARGE: 0
CHEST TIGHTNESS: 0
VOMITING: 0
CONSTIPATION: 0
COUGH: 0
ABDOMINAL PAIN: 0
SHORTNESS OF BREATH: 0

## 2025-02-27 NOTE — PROGRESS NOTES
Theresa Lacey (:  1960) is a 64 y.o. female, Established patient, here for evaluation of the following chief complaint(s):  Blood Sugar Problem (Feels worried about her sugar, notes she is having nerve pain in her feet and her leg. Notes she forgot to get her blood work completed. States she watches what she eats. )         Assessment & Plan  1. Left-sided neuropathy.  The patient's symptoms suggest a potential exacerbation of her chronic back pain, now presenting as neuropathy in the left leg and foot. Given her history of L4-L5 fusion with bilateral pedicle screws, it is plausible that the current neuropathic pain is originating from her back. An updated x-ray of the lumbar spine will be ordered to assess the current state of her back. Gabapentin will be initiated at a low dose, to be taken nightly for 30 days. After one week, the dosage can be increased to twice daily, once in the morning and once at night. A referral to the neurosurgical group at [facility] will be made for further evaluation and consideration of alternative procedures or injections.    2. Constipation.  She reports difficulty with bowel movements but does not describe it as severe constipation. She is advised to increase her fiber intake through foods like fruits and vegetables or consider using fiber supplements such as Metamucil or fiber gummies.    3. Prediabetes.  She expresses concern about her prediabetic status and the potential for neuropathy. Blood work will be done tomorrow morning to monitor her condition.    4. Blurred vision.  She reports blurred vision, which is attributed to astigmatism. She is advised to keep her glasses on and ensure her visual exams are up to date.    5. Depression.  Her depression screening was negative today, with a PHQ-9 score of zero. She will continue her current medication, Celexa, as it appears to be effectively managing her symptoms.    6. Health maintenance.  She declines COVID-19,

## 2025-03-19 ENCOUNTER — OFFICE VISIT (OUTPATIENT)
Dept: FAMILY MEDICINE CLINIC | Age: 65
End: 2025-03-19
Payer: COMMERCIAL

## 2025-03-19 VITALS
OXYGEN SATURATION: 97 % | TEMPERATURE: 98 F | SYSTOLIC BLOOD PRESSURE: 100 MMHG | HEART RATE: 75 BPM | RESPIRATION RATE: 18 BRPM | DIASTOLIC BLOOD PRESSURE: 72 MMHG | HEIGHT: 63 IN | BODY MASS INDEX: 26.93 KG/M2 | WEIGHT: 152 LBS

## 2025-03-19 DIAGNOSIS — M21.611 BILATERAL BUNIONS: ICD-10-CM

## 2025-03-19 DIAGNOSIS — M54.41 CHRONIC RIGHT-SIDED LOW BACK PAIN WITH RIGHT-SIDED SCIATICA: Primary | ICD-10-CM

## 2025-03-19 DIAGNOSIS — M21.612 BILATERAL BUNIONS: ICD-10-CM

## 2025-03-19 DIAGNOSIS — G89.29 CHRONIC BILATERAL LOW BACK PAIN WITHOUT SCIATICA: ICD-10-CM

## 2025-03-19 DIAGNOSIS — G89.29 CHRONIC RIGHT-SIDED LOW BACK PAIN WITH RIGHT-SIDED SCIATICA: Primary | ICD-10-CM

## 2025-03-19 DIAGNOSIS — M54.50 CHRONIC BILATERAL LOW BACK PAIN WITHOUT SCIATICA: ICD-10-CM

## 2025-03-19 DIAGNOSIS — Z12.4 SCREENING FOR CERVICAL CANCER: ICD-10-CM

## 2025-03-19 PROCEDURE — 3017F COLORECTAL CA SCREEN DOC REV: CPT | Performed by: NURSE PRACTITIONER

## 2025-03-19 PROCEDURE — G8419 CALC BMI OUT NRM PARAM NOF/U: HCPCS | Performed by: NURSE PRACTITIONER

## 2025-03-19 PROCEDURE — 1036F TOBACCO NON-USER: CPT | Performed by: NURSE PRACTITIONER

## 2025-03-19 PROCEDURE — 3074F SYST BP LT 130 MM HG: CPT | Performed by: NURSE PRACTITIONER

## 2025-03-19 PROCEDURE — G8427 DOCREV CUR MEDS BY ELIG CLIN: HCPCS | Performed by: NURSE PRACTITIONER

## 2025-03-19 PROCEDURE — 99214 OFFICE O/P EST MOD 30 MIN: CPT | Performed by: NURSE PRACTITIONER

## 2025-03-19 PROCEDURE — 3078F DIAST BP <80 MM HG: CPT | Performed by: NURSE PRACTITIONER

## 2025-03-19 RX ORDER — GABAPENTIN 100 MG/1
100 CAPSULE ORAL 3 TIMES DAILY
Qty: 90 CAPSULE | Refills: 1 | Status: SHIPPED | OUTPATIENT
Start: 2025-03-19 | End: 2025-04-18

## 2025-03-19 ASSESSMENT — ENCOUNTER SYMPTOMS
VOMITING: 0
ABDOMINAL PAIN: 0
DIARRHEA: 0
CONSTIPATION: 0
RHINORRHEA: 0
SHORTNESS OF BREATH: 0
BACK PAIN: 1
EYE DISCHARGE: 0
COUGH: 0
CHEST TIGHTNESS: 0

## 2025-03-19 NOTE — PROGRESS NOTES
Health Maintenance Due   Topic Date Due    Cervical cancer screen  05/20/2017    Breast cancer screen  10/12/2024    Lung Cancer Screening &/or Counseling  11/27/2024       
appointment consented to the use of AI, including the recording.      An electronic signature was used to authenticate this note.    --KELSIE JOHNSON - CNP

## 2025-03-29 LAB — CYTOLOGY THIN PREP PAP: NORMAL

## 2025-03-31 ENCOUNTER — RESULTS FOLLOW-UP (OUTPATIENT)
Dept: FAMILY MEDICINE CLINIC | Age: 65
End: 2025-03-31

## 2025-04-10 ENCOUNTER — OFFICE VISIT (OUTPATIENT)
Dept: NEUROSURGERY | Age: 65
End: 2025-04-10
Payer: COMMERCIAL

## 2025-04-10 VITALS
HEIGHT: 63 IN | DIASTOLIC BLOOD PRESSURE: 78 MMHG | BODY MASS INDEX: 26.58 KG/M2 | SYSTOLIC BLOOD PRESSURE: 115 MMHG | WEIGHT: 150 LBS | HEART RATE: 58 BPM

## 2025-04-10 DIAGNOSIS — M51.369 ADJACENT SEGMENT DISEASE OF LUMBAR SPINE WITH HISTORY OF FUSION PROCEDURE: Primary | ICD-10-CM

## 2025-04-10 DIAGNOSIS — Z98.1 ADJACENT SEGMENT DISEASE OF LUMBAR SPINE WITH HISTORY OF FUSION PROCEDURE: Primary | ICD-10-CM

## 2025-04-10 PROCEDURE — G8427 DOCREV CUR MEDS BY ELIG CLIN: HCPCS | Performed by: PHYSICIAN ASSISTANT

## 2025-04-10 PROCEDURE — 3074F SYST BP LT 130 MM HG: CPT | Performed by: PHYSICIAN ASSISTANT

## 2025-04-10 PROCEDURE — 3017F COLORECTAL CA SCREEN DOC REV: CPT | Performed by: PHYSICIAN ASSISTANT

## 2025-04-10 PROCEDURE — 1036F TOBACCO NON-USER: CPT | Performed by: PHYSICIAN ASSISTANT

## 2025-04-10 PROCEDURE — 3078F DIAST BP <80 MM HG: CPT | Performed by: PHYSICIAN ASSISTANT

## 2025-04-10 PROCEDURE — 99204 OFFICE O/P NEW MOD 45 MIN: CPT | Performed by: PHYSICIAN ASSISTANT

## 2025-04-10 PROCEDURE — G8419 CALC BMI OUT NRM PARAM NOF/U: HCPCS | Performed by: PHYSICIAN ASSISTANT

## 2025-04-10 ASSESSMENT — ENCOUNTER SYMPTOMS
SHORTNESS OF BREATH: 0
APNEA: 0
CHEST TIGHTNESS: 0
BACK PAIN: 1

## 2025-04-10 NOTE — PROGRESS NOTES
[facility] NEUROSURGERY DEPARTMENT  [address]  [address]  [place] [id number]  Dept: [phone]  Dept Fax: [phone]      Patient Name:  Theresa Lacey  Visit Date:  4/10/2025    HPI:     Ms. Lacey is a 64 y.o. female that presents today at [facility] for evaluation of the following: Chronic low back pain referral    Chief Complaint   Patient presents with    New Patient     Referred by: Mamta Kirby  Chronic right sided low back pain with right sided sciatica   Chronic low back pain         HPI   Mrs. Lacey is a pleasant, active 64-year-old female referred to our service for evaluation of radiating chronic low back pain.  She is a former smoker tobacco with a quit date in 2011 who denies smokeless tobacco or vaping product use but admits to regular alcohol consumption and has a medical history consistent with chronic back pain dating to 2016, depression, fibromyalgia, history for headache (unspecified), hyperlipidemia and hypertension, nausea and vomiting, postoperative nausea and vomiting, tobacco dependency history and surgical history that includes prior back surgery in the form of L4-5 decompression and posterior instrumented fusion dating to 2013 as well as prior anterior cervical decompression and fusion of cervical 5 6 dating to 2000.    Today's visit includes an x-ray of the lumbar spine, images on 2/26/2025, highlighting the prior fusion with disc spaces otherwise well-maintained.        She arrives today accompanied by her  and ambulating without assistance with pain moderately controlled.  She states that the pain has been ongoing since her prior lumbar fusion, performed by Dr. Saint Clair, with worsening symptoms over the last year.  She has been treating conservatively with needling and exercise which provides some relief.  She has not been seen or evaluated by a pain specialist and does not have any detailed imaging outside of

## 2025-05-02 ENCOUNTER — HOSPITAL ENCOUNTER (OUTPATIENT)
Dept: CT IMAGING | Age: 65
Discharge: HOME | End: 2025-05-02
Payer: COMMERCIAL

## 2025-05-02 ENCOUNTER — HOSPITAL ENCOUNTER (OUTPATIENT)
Dept: MRI IMAGING | Age: 65
Discharge: HOME | End: 2025-05-02
Payer: COMMERCIAL

## 2025-05-02 DIAGNOSIS — M51.369 ADJACENT SEGMENT DISEASE OF LUMBAR SPINE WITH HISTORY OF FUSION PROCEDURE: ICD-10-CM

## 2025-05-02 DIAGNOSIS — Z98.1 ADJACENT SEGMENT DISEASE OF LUMBAR SPINE WITH HISTORY OF FUSION PROCEDURE: ICD-10-CM

## 2025-05-02 PROCEDURE — 6360000004 HC RX CONTRAST MEDICATION: Performed by: PHYSICIAN ASSISTANT

## 2025-05-02 PROCEDURE — 72131 CT LUMBAR SPINE W/O DYE: CPT

## 2025-05-02 PROCEDURE — 72158 MRI LUMBAR SPINE W/O & W/DYE: CPT

## 2025-05-02 PROCEDURE — A9579 GAD-BASE MR CONTRAST NOS,1ML: HCPCS | Performed by: PHYSICIAN ASSISTANT

## 2025-05-02 RX ADMIN — GADOTERIDOL 15 ML: 279.3 INJECTION, SOLUTION INTRAVENOUS at 08:01

## 2025-05-07 ENCOUNTER — OFFICE VISIT (OUTPATIENT)
Dept: NEUROSURGERY | Age: 65
End: 2025-05-07
Payer: COMMERCIAL

## 2025-05-07 VITALS
DIASTOLIC BLOOD PRESSURE: 75 MMHG | HEART RATE: 52 BPM | BODY MASS INDEX: 26.93 KG/M2 | HEIGHT: 63 IN | SYSTOLIC BLOOD PRESSURE: 108 MMHG | WEIGHT: 152 LBS

## 2025-05-07 DIAGNOSIS — G89.4 CHRONIC PAIN SYNDROME: Primary | ICD-10-CM

## 2025-05-07 PROCEDURE — G8419 CALC BMI OUT NRM PARAM NOF/U: HCPCS | Performed by: PHYSICIAN ASSISTANT

## 2025-05-07 PROCEDURE — 3074F SYST BP LT 130 MM HG: CPT | Performed by: PHYSICIAN ASSISTANT

## 2025-05-07 PROCEDURE — 99214 OFFICE O/P EST MOD 30 MIN: CPT | Performed by: PHYSICIAN ASSISTANT

## 2025-05-07 PROCEDURE — 3017F COLORECTAL CA SCREEN DOC REV: CPT | Performed by: PHYSICIAN ASSISTANT

## 2025-05-07 PROCEDURE — 1036F TOBACCO NON-USER: CPT | Performed by: PHYSICIAN ASSISTANT

## 2025-05-07 PROCEDURE — G8427 DOCREV CUR MEDS BY ELIG CLIN: HCPCS | Performed by: PHYSICIAN ASSISTANT

## 2025-05-07 PROCEDURE — 3078F DIAST BP <80 MM HG: CPT | Performed by: PHYSICIAN ASSISTANT

## 2025-05-07 NOTE — PROGRESS NOTES
[facility] [place] [facility]  [facility] NEUROSURGERY  [address]  [facility] [address]  Dept: [phone]  Dept Fax: [phone]  Loc: [phone]    Follow-up visit  Visit Date: 5/7/2025      Theresa  is a 64 y.o. female who is returning to the office today for a follow-up visit for continuing evaluation of chronic low back pain.  She was most recently seen and evaluated in our office setting on 4/10/2025 as a referral with a history for prior decompression and instrumented fusion of lumbar 4 5 dating to 2013 as well as prior ACDF dating to 2000.  She related ongoing back pain dating to prior lumbar fusion performed by Dr. Saint Clair with symptoms worsening over the last year.  Pain is described as low back radiating posterior buttocks posterior lateral thigh calf to the bottom of the foot including the toes.  She denies significant weakness outside of breakaway weakness and was intact for strength on exam.  Updated MRI and CT scanning is ordered with a referral to neurology and pain management.    She arrives today having undergone a CT scan of the lumbar spine without contrast on 5/2/2025 revealing solid osseous fusion across the posterior elements at L4-5 with intact hardware.        An MRI of the lumbar spine imaged with and without contrast on 5/2/2025 is also reviewed revealing largely mild degenerative changes of the lumbar spine with no associated canal or foraminal stenosis noted.         She arrives today again accompanied by her  and was comfortable with pain well to moderately controlled.  We reviewed the above referenced imaging and absent any indication for additional acute neurosurgical intervention began discussions centered around more conservative treatments including spinal cord stimulation.  A detailed discussion involving the trial process and permanent implantation was had with the patient and patient's  with the patient declaring that

## 2025-05-12 ENCOUNTER — OFFICE VISIT (OUTPATIENT)
Dept: PHYSICAL MEDICINE AND REHAB | Age: 65
End: 2025-05-12
Payer: COMMERCIAL

## 2025-05-12 ENCOUNTER — TELEPHONE (OUTPATIENT)
Dept: PHYSICAL MEDICINE AND REHAB | Age: 65
End: 2025-05-12

## 2025-05-12 VITALS
HEIGHT: 63 IN | BODY MASS INDEX: 26.93 KG/M2 | DIASTOLIC BLOOD PRESSURE: 62 MMHG | SYSTOLIC BLOOD PRESSURE: 118 MMHG | WEIGHT: 152 LBS

## 2025-05-12 DIAGNOSIS — M47.816 LUMBAR SPONDYLOSIS: Primary | ICD-10-CM

## 2025-05-12 DIAGNOSIS — Z98.1 HISTORY OF LUMBAR FUSION: ICD-10-CM

## 2025-05-12 DIAGNOSIS — G89.4 CHRONIC PAIN SYNDROME: ICD-10-CM

## 2025-05-12 DIAGNOSIS — M53.3 SACROILIAC JOINT DYSFUNCTION: ICD-10-CM

## 2025-05-12 DIAGNOSIS — M47.816 LUMBAR FACET ARTHROPATHY: ICD-10-CM

## 2025-05-12 PROCEDURE — 1036F TOBACCO NON-USER: CPT | Performed by: NURSE PRACTITIONER

## 2025-05-12 PROCEDURE — G8427 DOCREV CUR MEDS BY ELIG CLIN: HCPCS | Performed by: NURSE PRACTITIONER

## 2025-05-12 PROCEDURE — 99204 OFFICE O/P NEW MOD 45 MIN: CPT | Performed by: NURSE PRACTITIONER

## 2025-05-12 PROCEDURE — 3078F DIAST BP <80 MM HG: CPT | Performed by: NURSE PRACTITIONER

## 2025-05-12 PROCEDURE — 3074F SYST BP LT 130 MM HG: CPT | Performed by: NURSE PRACTITIONER

## 2025-05-12 PROCEDURE — 3017F COLORECTAL CA SCREEN DOC REV: CPT | Performed by: NURSE PRACTITIONER

## 2025-05-12 PROCEDURE — G8419 CALC BMI OUT NRM PARAM NOF/U: HCPCS | Performed by: NURSE PRACTITIONER

## 2025-05-12 ASSESSMENT — ENCOUNTER SYMPTOMS: BACK PAIN: 1

## 2025-05-12 NOTE — PROGRESS NOTES
HPI:     ChiefComplaint: Lumbar back pain SI pain     H/O  prior decompression and instrumented fusion of lumbar 4- 5 dating to 2013, prior ACDF dating to 2000.  Prior lumbar fusion performed by Dr. Saint Clair with symptoms worsening over the last year.  Pain is described as low back radiating posterior buttocks posterior lateral thigh calf to the bottom of the foot including the toes. Most recently seen Dr Kuhn and Jorge L for increased back pain over past  1 yr. They did not  recommend surgical intervention they discussed SCS which she is not interested in at this time. She had recent updated Lumbar XR CT MRI     She doesn't know why she was referred  here for injections ,she is not  interested in injections has had many in her neck without relief  and gained  40 lbs.  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 She now works as a  with constant twisting turning lifting standing at her job.   She denies trauma fall or MVC with back injury   She has constant low lumbar back pain  bilateral SI pain worse on right side. Low back pain is 90% of her pain complaint  10 % LLE thigh pain She also has creepy crawling sensation thoracic upper  thoracic  lumbar.  She has back stiffness with spasms , antalgic gait due to LLE  weakness and pain./ She is unable to tolerate  yard work housework  She has increased pain mostly with  walking standing bending twisting.   She is in dry needling once a week for past 12 months . Frequents Chiropractor  and continues HEP        Patient pain increases with bending, lifting, twisting , turning torso, reaching, pushing, pulling, walking, standing, stairs, sitting, getting up and down, laying, and housework or working at job.  Treatments tried PT/HEP, Neuropathic medications, Chiropractor, NSAIDS, dry needling

## 2025-05-12 NOTE — PROGRESS NOTES
Functionality Assessment/Goals Worksheet     On a scale of 0 (Does not Interfere) to 10 (Completely Interferes)     1.  Which number describes how during the past week pain has interfered with           the following:  A.  General Activity:  7  B.  Mood: 8  C.  Walking Ability:  5  D.  Normal Work (Includes both work outside the home and housework):  6  E.  Relations with Other People:   5  F.  Sleep:   6  G.  Enjoyment of Life:   5    2.  Patient Prefers to Take their Pain Medications:     []  On a regular basis   []  Only when necessary    []  Does not take pain medications    3.  What are the Patient's Goals/Expectations for Visiting Pain Management?     [x]  Learn about my pain    []  Receive Medication   []  Physical Therapy     []  Treat Depression   []  Receive Injections    []  Treat Sleep   []  Deal with Anxiety and Stress   []  Treat Opoid Dependence/Addiction   []  Other:                                [facility] PHYSICIANS [facility] NEUROSCIENCE AND REHABILITATION [address] [address]  Dept: [phone]  Dept Fax: [phone]  Loc: [phone]    Visit Date: 5/12/2025    Theresa Lacey is a 64 y.o. female who is referred for pain management evaluation and treatment per Dr. Stoll.                CAGE and CAGE-AID Questions   1. In the last three months, have you felt you should cut down or stop drinking or using drugs?  Yes []        No [x]     2. In the last three months, has anyone annoyed you or gotten on your nerves by telling you to cut down or stop drinking or using drugs?  Yes []        No [x]     3. In the last three months, have you felt guilty or bad about how much you drink or use drugs?   Yes []        No [x]     4. In the last three months, have you been waking up wanting to have an alcoholic drink or use drugs?  Yes []        No [x]        Opioid Risk Tool:  Clinician Form       1. Family History of Substance Abuse:

## 2025-05-12 NOTE — TELEPHONE ENCOUNTER
Patient denies taking blood thinners prior to procedure that is scheduled on 06/23/25 with Dr. Martinez.

## 2025-06-20 ENCOUNTER — TELEPHONE (OUTPATIENT)
Dept: PHYSICAL MEDICINE AND REHAB | Age: 65
End: 2025-06-20

## 2025-06-24 ENCOUNTER — OFFICE VISIT (OUTPATIENT)
Dept: FAMILY MEDICINE CLINIC | Age: 65
End: 2025-06-24
Payer: COMMERCIAL

## 2025-06-24 VITALS
HEART RATE: 57 BPM | RESPIRATION RATE: 18 BRPM | TEMPERATURE: 98.1 F | OXYGEN SATURATION: 100 % | BODY MASS INDEX: 26.75 KG/M2 | SYSTOLIC BLOOD PRESSURE: 110 MMHG | WEIGHT: 151 LBS | HEIGHT: 63 IN | DIASTOLIC BLOOD PRESSURE: 78 MMHG

## 2025-06-24 DIAGNOSIS — E78.00 PURE HYPERCHOLESTEROLEMIA: ICD-10-CM

## 2025-06-24 DIAGNOSIS — R73.03 PREDIABETES: ICD-10-CM

## 2025-06-24 DIAGNOSIS — F34.1 PERSISTENT DEPRESSIVE DISORDER: ICD-10-CM

## 2025-06-24 DIAGNOSIS — M79.7 FIBROMYALGIA: ICD-10-CM

## 2025-06-24 DIAGNOSIS — M65.342 TRIGGER RING FINGER OF LEFT HAND: ICD-10-CM

## 2025-06-24 DIAGNOSIS — Z79.899 MEDICAL MARIJUANA USE: ICD-10-CM

## 2025-06-24 DIAGNOSIS — G89.29 CHRONIC BILATERAL LOW BACK PAIN WITHOUT SCIATICA: ICD-10-CM

## 2025-06-24 DIAGNOSIS — I10 ESSENTIAL HYPERTENSION: Primary | ICD-10-CM

## 2025-06-24 DIAGNOSIS — K21.9 GASTROESOPHAGEAL REFLUX DISEASE, UNSPECIFIED WHETHER ESOPHAGITIS PRESENT: ICD-10-CM

## 2025-06-24 DIAGNOSIS — M54.50 CHRONIC BILATERAL LOW BACK PAIN WITHOUT SCIATICA: ICD-10-CM

## 2025-06-24 DIAGNOSIS — K59.01 SLOW TRANSIT CONSTIPATION: ICD-10-CM

## 2025-06-24 PROCEDURE — 1036F TOBACCO NON-USER: CPT | Performed by: NURSE PRACTITIONER

## 2025-06-24 PROCEDURE — G8427 DOCREV CUR MEDS BY ELIG CLIN: HCPCS | Performed by: NURSE PRACTITIONER

## 2025-06-24 PROCEDURE — 99214 OFFICE O/P EST MOD 30 MIN: CPT | Performed by: NURSE PRACTITIONER

## 2025-06-24 PROCEDURE — 3078F DIAST BP <80 MM HG: CPT | Performed by: NURSE PRACTITIONER

## 2025-06-24 PROCEDURE — G8419 CALC BMI OUT NRM PARAM NOF/U: HCPCS | Performed by: NURSE PRACTITIONER

## 2025-06-24 PROCEDURE — 3017F COLORECTAL CA SCREEN DOC REV: CPT | Performed by: NURSE PRACTITIONER

## 2025-06-24 PROCEDURE — 3074F SYST BP LT 130 MM HG: CPT | Performed by: NURSE PRACTITIONER

## 2025-06-24 ASSESSMENT — ENCOUNTER SYMPTOMS
CHEST TIGHTNESS: 0
VOMITING: 0
EYE DISCHARGE: 0
RHINORRHEA: 0
SHORTNESS OF BREATH: 0
DIARRHEA: 0
CONSTIPATION: 0
ABDOMINAL PAIN: 0
COUGH: 0

## 2025-06-24 NOTE — PROGRESS NOTES
Health Maintenance Due   Topic Date Due    Breast cancer screen  10/12/2024    Lung Cancer Screening &/or Counseling  11/27/2024    Colorectal Cancer Screen  05/17/2025

## 2025-06-24 NOTE — PROGRESS NOTES
Theresa Lacey (:  1960) is a 64 y.o. female, Established patient, here for evaluation of the following chief complaint(s):  Follow-up Chronic Condition (3 month follow up )         Assessment & Plan  1. Chronic back pain.  - She is currently on gabapentin and medical marijuana for pain management.  - No new symptoms reported; pain management is ongoing.  - She has an upcoming appointment with neurology at [facility] on 2025 to discuss further options.  - Advised to weigh all options before making a final decision regarding the bilateral lumbar 4, 5, sacral 1 facet medial branch block.    2. Hypertension.  - Blood pressure is well-controlled with lisinopril.  - No chest pain or palpitations reported.  - Blood pressure readings are within normal limits.  - No changes to the current medication regimen are necessary.    3. Prediabetes.  - Last A1c was 6.1 in 2025.  - No new symptoms reported; condition is stable.  - Repeat lab work including CBC, CMP, A1c, and thyroid will be done by the end of August to monitor prediabetes status.  - Labs to be done a day or two before the next visit to review results.    4. Constipation.  - She has started taking a prebiotic-probiotic supplement and reports feeling better with less bloating.  - Bowel movements are more regular with the supplement.  - No new symptoms reported; condition is improving.  - Advised to continue with the supplement.    5. Acid reflux.  - She is on Protonix, which is effectively managing her symptoms.  - No new symptoms reported; condition is stable.  - Medication effectiveness is confirmed.  - No changes to the current medication regimen are necessary.    6. High cholesterol.  - She is on Crestor.  - Last cholesterol check was in 2025.  - No new symptoms reported; condition is stable.  - Repeat cholesterol test will be done in one year.    7. Fibromyalgia.  - She is on gabapentin and medical marijuana, which are helping manage her

## 2025-06-26 DIAGNOSIS — G89.29 CHRONIC RIGHT-SIDED LOW BACK PAIN WITH RIGHT-SIDED SCIATICA: ICD-10-CM

## 2025-06-26 DIAGNOSIS — M54.50 CHRONIC BILATERAL LOW BACK PAIN WITHOUT SCIATICA: ICD-10-CM

## 2025-06-26 DIAGNOSIS — G89.29 CHRONIC BILATERAL LOW BACK PAIN WITHOUT SCIATICA: ICD-10-CM

## 2025-06-26 DIAGNOSIS — M54.41 CHRONIC RIGHT-SIDED LOW BACK PAIN WITH RIGHT-SIDED SCIATICA: ICD-10-CM

## 2025-06-26 RX ORDER — GABAPENTIN 100 MG/1
100 CAPSULE ORAL 3 TIMES DAILY
Qty: 90 CAPSULE | Refills: 1 | Status: SHIPPED | OUTPATIENT
Start: 2025-06-26 | End: 2025-09-24

## 2025-06-26 NOTE — TELEPHONE ENCOUNTER
Theresa Lacey called requesting a refill on the following medications:  Requested Prescriptions     Pending Prescriptions Disp Refills    gabapentin (NEURONTIN) 100 MG capsule [Pharmacy Med Name: gabapentin 100 mg capsule] 90 capsule 1     Sig: Take 1 capsule by mouth 3 times daily.       Date of last visit: 6/24/2025  Date of next visit (if applicable):9/24/2025  Date of last refill: 03/19/2025  [facility] Name: [facility]

## 2025-07-17 ENCOUNTER — TRANSCRIBE ORDERS (OUTPATIENT)
Dept: ADMINISTRATIVE | Age: 65
End: 2025-07-17

## 2025-07-17 DIAGNOSIS — M54.12 CHRONIC CERVICAL RADICULOPATHY: ICD-10-CM

## 2025-07-17 DIAGNOSIS — M54.6 THORACIC BACK PAIN, UNSPECIFIED BACK PAIN LATERALITY, UNSPECIFIED CHRONICITY: ICD-10-CM

## 2025-07-17 DIAGNOSIS — G25.81 RESTLESS LEGS SYNDROME: Primary | ICD-10-CM

## 2025-07-17 DIAGNOSIS — R20.0 HAND NUMBNESS: ICD-10-CM

## 2025-07-24 ENCOUNTER — TELEPHONE (OUTPATIENT)
Dept: PHYSICAL MEDICINE AND REHAB | Age: 65
End: 2025-07-24

## 2025-07-24 ENCOUNTER — OFFICE VISIT (OUTPATIENT)
Dept: PAIN MANAGEMENT | Age: 65
End: 2025-07-24
Payer: COMMERCIAL

## 2025-07-24 VITALS — BODY MASS INDEX: 26.75 KG/M2 | HEIGHT: 63 IN | WEIGHT: 151 LBS

## 2025-07-24 DIAGNOSIS — M46.1 SI (SACROILIAC) JOINT INFLAMMATION: ICD-10-CM

## 2025-07-24 DIAGNOSIS — M47.816 LUMBAR SPONDYLOSIS: Primary | ICD-10-CM

## 2025-07-24 DIAGNOSIS — G89.4 CHRONIC PAIN SYNDROME: ICD-10-CM

## 2025-07-24 DIAGNOSIS — M47.816 LUMBAR FACET ARTHROPATHY: ICD-10-CM

## 2025-07-24 DIAGNOSIS — M53.3 SACROILIAC JOINT DYSFUNCTION: ICD-10-CM

## 2025-07-24 PROCEDURE — G8419 CALC BMI OUT NRM PARAM NOF/U: HCPCS | Performed by: NURSE PRACTITIONER

## 2025-07-24 PROCEDURE — 1036F TOBACCO NON-USER: CPT | Performed by: NURSE PRACTITIONER

## 2025-07-24 PROCEDURE — 99214 OFFICE O/P EST MOD 30 MIN: CPT | Performed by: NURSE PRACTITIONER

## 2025-07-24 PROCEDURE — G8427 DOCREV CUR MEDS BY ELIG CLIN: HCPCS | Performed by: NURSE PRACTITIONER

## 2025-07-24 PROCEDURE — 3017F COLORECTAL CA SCREEN DOC REV: CPT | Performed by: NURSE PRACTITIONER

## 2025-07-24 ASSESSMENT — ENCOUNTER SYMPTOMS: BACK PAIN: 1

## 2025-07-24 NOTE — PROGRESS NOTES
Functionality Assessment/Goals Worksheet     On a scale of 0 (Does not Interfere) to 10 (Completely Interferes)     1.  Which number describes how during the past week pain has interfered with           the following:  A.  General Activity:  6  B.  Mood: 8  C.  Walking Ability:  5  D.  Normal Work (Includes both work outside the home and housework):  5  E.  Relations with Other People:   8  F.  Sleep:   8  G.  Enjoyment of Life:   5    2.  Patient Prefers to Take their Pain Medications:     []  On a regular basis   []  Only when necessary    [x]  Does not take pain medications    3.  What are the Patient's Goals/Expectations for Visiting Pain Management?     []  Learn about my pain    []  Receive Medication   []  Physical Therapy     []  Treat Depression   []  Receive Injections    []  Treat Sleep   []  Deal with Anxiety and Stress   []  Treat Opoid Dependence/Addiction   []  Other:

## 2025-07-24 NOTE — PROGRESS NOTES
HPI:     ChiefComplaint: Lumbar back pain SI pain  cervical thoracic pain hip  pain     F/U Lumbar Facet MBB #1 @ L4-5,5-S1 Bilateral was not completed pt called and cancelled due to upcoming  Neuro Consult.   She has seen Dr Wilson now pending  updated Cervical Thoracici MRI 8/15/2025  for her other pain issues   I am  working on her lumbar pain.    She has continued low back axial facet mediated pain. Increased pain with walking standing bending lifting.   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  Increased pain with walking,  going to sit stand     HPI  H/O  prior decompression and instrumented fusion of lumbar 4- 5 dating to 2013, prior ACDF dating to 2000.  Prior lumbar fusion performed by Dr. Saint Clair with symptoms worsening over the last year.  Pain is described as low back radiating posterior buttocks posterior lateral thigh calf to the bottom of the foot including the toes. Most recently seen Dr Kuhn and Jorge L for increased back pain over past  1 yr. They did not  recommend surgical intervention they discussed SCS which she is not interested in at this time. She had recent updated Lumbar XR CT MRI     She doesn't know why she was referred  here for injections ,she is not  interested in injections has had many in her neck without relief  and gained  40 lbs.  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 She now works as a  with constant twisting turning lifting standing at her job.   She denies trauma fall or MVC with back injury   She has constant low lumbar back pain  bilateral SI pain worse on right side. Low back pain is 90% of her pain complaint  10 % LLE thigh pain She also has creepy crawling sensation thoracic upper  thoracic  lumbar.  She has

## 2025-08-18 ENCOUNTER — HOSPITAL ENCOUNTER (OUTPATIENT)
Dept: MRI IMAGING | Age: 65
Discharge: HOME | End: 2025-08-18
Payer: COMMERCIAL

## 2025-08-18 ENCOUNTER — LAB (OUTPATIENT)
Dept: LAB | Age: 65
End: 2025-08-18

## 2025-08-18 DIAGNOSIS — R20.0 HAND NUMBNESS: ICD-10-CM

## 2025-08-18 DIAGNOSIS — M54.12 CHRONIC CERVICAL RADICULOPATHY: ICD-10-CM

## 2025-08-18 DIAGNOSIS — M54.6 THORACIC BACK PAIN, UNSPECIFIED BACK PAIN LATERALITY, UNSPECIFIED CHRONICITY: ICD-10-CM

## 2025-08-18 DIAGNOSIS — G25.81 RESTLESS LEGS SYNDROME: ICD-10-CM

## 2025-08-18 LAB
FERRITIN SERPL IA-MCNC: 96 NG/ML (ref 13–150)
FOLATE SERPL-MCNC: 7.2 NG/ML (ref 4.6–34.8)
IRON SATN MFR SERPL: 22 % (ref 20–50)
IRON SERPL-MCNC: 63 UG/DL (ref 37–145)
TIBC SERPL-MCNC: 292 UG/DL (ref 171–450)
VIT B12 SERPL-MCNC: 347 PG/ML (ref 232–1245)

## 2025-08-18 PROCEDURE — 72146 MRI CHEST SPINE W/O DYE: CPT

## 2025-08-18 PROCEDURE — 72141 MRI NECK SPINE W/O DYE: CPT

## 2025-08-20 LAB — VIT B1 PYROPHOSHATE BLD-SCNC: 134 NMOL/L (ref 70–180)

## 2025-08-21 LAB — IMMUNOFIXATION WITH QUANT: NORMAL

## 2025-08-23 LAB — PYRIDOXAL PHOS SERPL-SCNC: 94.3 NMOL/L (ref 20–125)

## 2025-08-27 ENCOUNTER — OFFICE VISIT (OUTPATIENT)
Dept: NEUROSURGERY | Age: 65
End: 2025-08-27
Payer: COMMERCIAL

## 2025-08-27 VITALS
BODY MASS INDEX: 26.22 KG/M2 | DIASTOLIC BLOOD PRESSURE: 80 MMHG | HEART RATE: 58 BPM | HEIGHT: 63 IN | SYSTOLIC BLOOD PRESSURE: 128 MMHG | WEIGHT: 148 LBS

## 2025-08-27 DIAGNOSIS — Z98.1 ADJACENT SEGMENT DISEASE OF CERVICAL SPINE AT C6-C7 LEVEL WITH HISTORY OF FUSION PROCEDURE: Primary | ICD-10-CM

## 2025-08-27 DIAGNOSIS — M50.323 ADJACENT SEGMENT DISEASE OF CERVICAL SPINE AT C6-C7 LEVEL WITH HISTORY OF FUSION PROCEDURE: Primary | ICD-10-CM

## 2025-08-27 DIAGNOSIS — M54.12 CERVICAL RADICULOPATHY: ICD-10-CM

## 2025-08-27 PROCEDURE — 1036F TOBACCO NON-USER: CPT | Performed by: PHYSICIAN ASSISTANT

## 2025-08-27 PROCEDURE — G8419 CALC BMI OUT NRM PARAM NOF/U: HCPCS | Performed by: PHYSICIAN ASSISTANT

## 2025-08-27 PROCEDURE — 99214 OFFICE O/P EST MOD 30 MIN: CPT | Performed by: PHYSICIAN ASSISTANT

## 2025-08-27 PROCEDURE — 3074F SYST BP LT 130 MM HG: CPT | Performed by: PHYSICIAN ASSISTANT

## 2025-08-27 PROCEDURE — G8427 DOCREV CUR MEDS BY ELIG CLIN: HCPCS | Performed by: PHYSICIAN ASSISTANT

## 2025-08-27 PROCEDURE — 3017F COLORECTAL CA SCREEN DOC REV: CPT | Performed by: PHYSICIAN ASSISTANT

## 2025-08-27 PROCEDURE — 3079F DIAST BP 80-89 MM HG: CPT | Performed by: PHYSICIAN ASSISTANT

## 2025-09-03 DIAGNOSIS — I10 ESSENTIAL HYPERTENSION: ICD-10-CM

## 2025-09-03 RX ORDER — LISINOPRIL 20 MG/1
20 TABLET ORAL DAILY
Qty: 90 TABLET | Refills: 1 | Status: SHIPPED | OUTPATIENT
Start: 2025-09-03

## 2025-09-04 DIAGNOSIS — G89.29 CHRONIC RIGHT-SIDED LOW BACK PAIN WITH RIGHT-SIDED SCIATICA: ICD-10-CM

## 2025-09-04 DIAGNOSIS — M54.41 CHRONIC RIGHT-SIDED LOW BACK PAIN WITH RIGHT-SIDED SCIATICA: ICD-10-CM

## 2025-09-04 DIAGNOSIS — E78.00 PURE HYPERCHOLESTEROLEMIA: ICD-10-CM

## 2025-09-04 DIAGNOSIS — G89.29 CHRONIC BILATERAL LOW BACK PAIN WITHOUT SCIATICA: ICD-10-CM

## 2025-09-04 DIAGNOSIS — F34.1 DYSTHYMIA: ICD-10-CM

## 2025-09-04 DIAGNOSIS — M54.50 CHRONIC BILATERAL LOW BACK PAIN WITHOUT SCIATICA: ICD-10-CM

## 2025-09-04 DIAGNOSIS — K21.9 GASTROESOPHAGEAL REFLUX DISEASE, UNSPECIFIED WHETHER ESOPHAGITIS PRESENT: ICD-10-CM

## 2025-09-04 RX ORDER — ROSUVASTATIN CALCIUM 40 MG/1
40 TABLET, COATED ORAL DAILY
Qty: 90 TABLET | Refills: 1 | Status: SHIPPED | OUTPATIENT
Start: 2025-09-04

## 2025-09-04 RX ORDER — CITALOPRAM HYDROBROMIDE 40 MG/1
40 TABLET ORAL DAILY
Qty: 90 TABLET | Refills: 1 | Status: SHIPPED | OUTPATIENT
Start: 2025-09-04

## (undated) DEVICE — SET ENDOSCP SEAL HYSTEROSCOPE RIG OUTFLO CHN DISP MYOSURE

## (undated) DEVICE — THE S40 STERILANT CONCENTRATE IS A SINGLE USE PERACETIC ACID-BASED CHEMISTRY USED IN THE SYSTEM 1E AND SYSTEM 1 ENDO LIQUID CHEMICAL STERILANT PROCESSING SYSTEMS.: Brand: S40

## (undated) DEVICE — POSITIONER HD W8XH4XL8.5IN RASPBERRY FOAM SLT

## (undated) DEVICE — Y-TYPE TUR/BLADDER IRRIGATION SET, REGULATING CLAMP

## (undated) DEVICE — SET FLD CTRL SYS INFLO AND OUTFLO TB AQUILEX

## (undated) DEVICE — SOLUTION IV IRRIG WATER 1000ML POUR BRL 2F7114

## (undated) DEVICE — PAD,NON-ADHERENT,3X8,STERILE,LF,1/PK: Brand: MEDLINE

## (undated) DEVICE — RED RUBBER ROBINSON URETHRAL CATHETER, RADIOPAQUE, SMOOTH ROUNDED TIP, 14 FR (4.7 MM): Brand: DOVER

## (undated) DEVICE — GLOVE ORANGE PI 7   MSG9070

## (undated) DEVICE — 2000CC GUARDIAN II: Brand: GUARDIAN

## (undated) DEVICE — DEVICE TISS REM DIA3MM L25.25IN ENDOSCP F/ IU POLYPS

## (undated) DEVICE — GAUZE,SPONGE,8"X4",12PLY,XRAY,STRL,LF: Brand: MEDLINE